# Patient Record
Sex: MALE | Race: WHITE | NOT HISPANIC OR LATINO
[De-identification: names, ages, dates, MRNs, and addresses within clinical notes are randomized per-mention and may not be internally consistent; named-entity substitution may affect disease eponyms.]

---

## 2017-12-11 ENCOUNTER — RX RENEWAL (OUTPATIENT)
Age: 72
End: 2017-12-11

## 2017-12-14 ENCOUNTER — TRANSCRIPTION ENCOUNTER (OUTPATIENT)
Age: 72
End: 2017-12-14

## 2017-12-20 ENCOUNTER — APPOINTMENT (OUTPATIENT)
Dept: UROLOGY | Facility: CLINIC | Age: 72
End: 2017-12-20
Payer: MEDICARE

## 2017-12-20 VITALS
HEIGHT: 68 IN | SYSTOLIC BLOOD PRESSURE: 140 MMHG | DIASTOLIC BLOOD PRESSURE: 80 MMHG | WEIGHT: 185 LBS | BODY MASS INDEX: 28.04 KG/M2 | HEART RATE: 78 BPM | OXYGEN SATURATION: 97 %

## 2017-12-20 DIAGNOSIS — N52.9 MALE ERECTILE DYSFUNCTION, UNSPECIFIED: ICD-10-CM

## 2017-12-20 DIAGNOSIS — E11.9 TYPE 2 DIABETES MELLITUS W/OUT COMPLICATIONS: ICD-10-CM

## 2017-12-20 PROCEDURE — 99203 OFFICE O/P NEW LOW 30 MIN: CPT

## 2017-12-20 RX ORDER — TAMSULOSIN HYDROCHLORIDE 0.4 MG/1
0.4 CAPSULE ORAL
Qty: 90 | Refills: 3 | Status: ACTIVE | COMMUNITY
Start: 1900-01-01 | End: 1900-01-01

## 2017-12-20 RX ORDER — SIMVASTATIN 80 MG/1
TABLET, FILM COATED ORAL
Refills: 0 | Status: ACTIVE | COMMUNITY

## 2017-12-20 RX ORDER — LISINOPRIL 20 MG/1
20 TABLET ORAL
Refills: 0 | Status: ACTIVE | COMMUNITY

## 2017-12-20 RX ORDER — SITAGLIPTIN AND METFORMIN HYDROCHLORIDE 50; 1000 MG/1; MG/1
TABLET, FILM COATED ORAL
Refills: 0 | Status: ACTIVE | COMMUNITY

## 2017-12-20 RX ORDER — ASPIRIN 81 MG
81 TABLET, DELAYED RELEASE (ENTERIC COATED) ORAL
Refills: 0 | Status: ACTIVE | COMMUNITY

## 2017-12-20 RX ORDER — OMEGA-3-ACID ETHYL ESTERS 1 G/1
CAPSULE, LIQUID FILLED ORAL
Refills: 0 | Status: ACTIVE | COMMUNITY

## 2017-12-20 RX ORDER — METFORMIN HYDROCHLORIDE 625 MG/1
TABLET ORAL
Refills: 0 | Status: ACTIVE | COMMUNITY

## 2019-06-03 PROBLEM — N52.9 MALE ERECTILE DISORDER: Status: RESOLVED | Noted: 2017-12-20 | Resolved: 2019-06-03

## 2020-02-14 ENCOUNTER — APPOINTMENT (OUTPATIENT)
Dept: UROLOGY | Facility: CLINIC | Age: 75
End: 2020-02-14
Payer: MEDICARE

## 2020-02-14 VITALS
HEIGHT: 68 IN | HEART RATE: 92 BPM | BODY MASS INDEX: 26.67 KG/M2 | OXYGEN SATURATION: 96 % | DIASTOLIC BLOOD PRESSURE: 72 MMHG | WEIGHT: 176 LBS | SYSTOLIC BLOOD PRESSURE: 144 MMHG | TEMPERATURE: 97.6 F

## 2020-02-14 PROCEDURE — 99213 OFFICE O/P EST LOW 20 MIN: CPT

## 2020-02-14 NOTE — HISTORY OF PRESENT ILLNESS
[FreeTextEntry1] : He is a 74-year-old man who is seen today in follow-up. He was seen once in December 2017. He is on Flomax. Nocturia is only one time. There is no hematuria or dysuria. Testicular pain has resolved. Residual urine today was 60 cc. In September 2019, urinalysis was normal, PSA level 2.98 and hemoglobin A1c was less than 7.\par Previous notes: In 2016, he underwent left-sided orchiectomy after ultrasound showed a suspicious small testicular mass. Final pathology showed scar and no malignancy. According to the patient, he had a postop hematoma which slowly resolved. In July 2017 PSA level was 2.7 and urinalysis was negative.

## 2020-02-14 NOTE — PHYSICAL EXAM
[General Appearance - Well Developed] : well developed [General Appearance - Well Nourished] : well nourished [Normal Appearance] : normal appearance [Well Groomed] : well groomed [General Appearance - In No Acute Distress] : no acute distress [Abdomen Soft] : soft [Abdomen Tenderness] : non-tender [Costovertebral Angle Tenderness] : no ~M costovertebral angle tenderness [Urethral Meatus] : meatus normal [Penis Abnormality] : normal circumcised penis [Urinary Bladder Findings] : the bladder was normal on palpation [Scrotum] : the scrotum was normal [Testes Mass (___cm)] : there were no testicular masses [Prostate Tenderness] : the prostate was not tender [No Prostate Nodules] : no prostate nodules [Prostate Enlarged] : was enlarged [FreeTextEntry1] : The left testis absent. [] : no respiratory distress [Respiration, Rhythm And Depth] : normal respiratory rhythm and effort [Exaggerated Use Of Accessory Muscles For Inspiration] : no accessory muscle use [Oriented To Time, Place, And Person] : oriented to person, place, and time [Affect] : the affect was normal [Mood] : the mood was normal [Not Anxious] : not anxious

## 2020-02-14 NOTE — LETTER BODY
[Dear  ___] : Dear  [unfilled], [Consult Letter:] : I had the pleasure of evaluating your patient, [unfilled]. [Consult Closing:] : Thank you very much for allowing me to participate in the care of this patient.  If you have any questions, please do not hesitate to contact me. [FreeTextEntry1] : [facility]\par [address] \[place], [place], [address]  \par [phone] \par

## 2020-02-14 NOTE — ASSESSMENT
[FreeTextEntry1] : Residual urine volume is minimal. He is voiding well with Flomax. Nocturia is only one time. He could followup once a year. PSA level was normal.\par \par Jaron Wells MD, FACS\par [facility]\par  of Urology\par \par [address], [address]\[place] [address]\par \par [address]\[place] [address]\par \par Tel: [phone]\par Fax: [phone]

## 2020-07-01 ENCOUNTER — LABORATORY RESULT (OUTPATIENT)
Age: 75
End: 2020-07-01

## 2020-07-01 ENCOUNTER — APPOINTMENT (OUTPATIENT)
Dept: PULMONOLOGY | Facility: CLINIC | Age: 75
End: 2020-07-01
Payer: MEDICARE

## 2020-07-01 VITALS
WEIGHT: 162 LBS | HEART RATE: 83 BPM | BODY MASS INDEX: 24.55 KG/M2 | OXYGEN SATURATION: 97 % | SYSTOLIC BLOOD PRESSURE: 133 MMHG | RESPIRATION RATE: 16 BRPM | DIASTOLIC BLOOD PRESSURE: 74 MMHG | HEIGHT: 68 IN

## 2020-07-01 PROCEDURE — 99204 OFFICE O/P NEW MOD 45 MIN: CPT

## 2020-07-01 NOTE — ASSESSMENT
[FreeTextEntry1] : \par \par VISIT DATES \par \par 7/1/2020 Initial visit Referred by Dr Lange for post COVID followup \par \par \par VISIT DATE COMPLAINTS/EVENTS/ROS/PE  \par \par  7/1/2020 No specific complaints No fever No anorexia No SOB on exertion No trouble walking No cough No phlegm No nocturnal awakening with sob  Cardio Dr Granger 483 2020 \par \par \par ALLERGY. 7/1/2020 pncl\par HABITS.   7/1/2020 Quit smoking 20 y 1/4 ppd \par FAMILY.   7/1/2020 [facility] \par TRAVEL. 7/1/2020 No                 \par IMMUNIZATION. 7/1/2020 Flu 2019 Pneumonia 2019 Shingles 2019                           \par PETS.   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   7/1/2020 133/70      \par HR.    7/1/2020 83                     \par PULSE OX. 7/1/2020 ra 97%                            \par WT.       7/1/2020 162 lb                        \par BMI.  7/1/2020 24.7 \par P EDEMA.   7/1/2020 Tr   \par DYSPNEA AT REST. 7/1/20207/1/2020 No \par HOME OXYGEN.  7/1/2020 No           \par HOME NIV.    7/1/2020         \par \par \par MEDICATIONS\par PULMONARY. \par 7/1/2020 \par Eliquis\par \par \par \par NONPLMONARY. \par 7/1/2020\par ASA 81 \par cilostazol 50.2 \par lisinopril 20 \par Simvastat 40 \par Metoprolol 50 \par Metformin\par Januvia \par Tamsulosin \par Naprosyn 500.2 \par \par \par PULMONARY PROBLEMS    \par \par COVID  4/2020 [facility] 3-4 d then rehab\par PULMONARY EMBOLISM 4/2020\par HO PNEUMONIA 4/2020 \par \par \par \par \par EXTRAPULMONARY PROBLEMS  \par \par CAD Dr Polo Granger \par A fib 6/23/2020 ekg a fib \par HO endovenous ablation of bl great saphenous vein \par BPH \par Saw Dr Wells 2/14/2020\par DM \par \par \par \par RESULTS\par \par \par ECHO\par 6/23/2020 ECHO N lvsf \par \par \par ____________________\par SHAZIAMICKEY LELIA HAN 1945 \par VISIT DATE 7/1/2020\par PROBLEM A/R\par ___________________\par HO COVID \par COVID  4/2020 [facility] 3-4 d then rehab\par Seems to have recovered  \par Denies significant symptoms \par \par HO PULMONARY EMBOLISM 4/2020\par On eliquis\par 7/1/2020 Pt to bring all meds with hime next visit\par 7/1/2020 Message left with Dr Cuellar Cardio office to send me echo report and notes  \par \par HO PNEUMONIA 4/2020 \par 7/1/2020 Check cxr labs V duplx legs \par 7/1/2020 RTC 3 w \par \par \par \par \par \par \par \par \par \par \par

## 2020-07-01 NOTE — CONSULT LETTER
[Dear  ___] : Dear  [unfilled], [Consult Letter:] : I had the pleasure of evaluating your patient, [unfilled]. [Please see my note below.] : Please see my note below. [Consult Closing:] : Thank you very much for allowing me to participate in the care of this patient.  If you have any questions, please do not hesitate to contact me. [Sincerely,] : Sincerely, [FreeTextEntry3] : Yours truly,\par \par Papi Trujillo MD\par

## 2020-07-01 NOTE — PHYSICAL EXAM
[No Acute Distress] : no acute distress [Normal Appearance] : normal appearance [No Neck Mass] : no neck mass [Normal Oropharynx] : normal oropharynx [Normal Rate/Rhythm] : normal rate/rhythm [Normal S1, S2] : normal s1, s2 [No Murmurs] : no murmurs [No Resp Distress] : no resp distress [Clear to Auscultation Bilaterally] : clear to auscultation bilaterally [No Abnormalities] : no abnormalities [Benign] : benign [No Clubbing] : no clubbing [No Cyanosis] : no cyanosis [Normal Gait] : normal gait [Normal Color/ Pigmentation] : normal color/ pigmentation [No Edema] : no edema [FROM] : FROM [No Focal Deficits] : no focal deficits [Oriented x3] : oriented x3 [Normal Affect] : normal affect

## 2020-07-03 LAB
ALBUMIN SERPL ELPH-MCNC: 4.4 G/DL
ALP BLD-CCNC: 67 U/L
ALT SERPL-CCNC: 22 U/L
ANION GAP SERPL CALC-SCNC: 13 MMOL/L
AST SERPL-CCNC: 24 U/L
BASOPHILS # BLD AUTO: 0.07 K/UL
BASOPHILS NFR BLD AUTO: 0.8 %
BILIRUB SERPL-MCNC: 0.4 MG/DL
BUN SERPL-MCNC: 24 MG/DL
CALCIUM SERPL-MCNC: 9.5 MG/DL
CHLORIDE SERPL-SCNC: 108 MMOL/L
CO2 SERPL-SCNC: 21 MMOL/L
CREAT SERPL-MCNC: 1.58 MG/DL
DEPRECATED D DIMER PPP IA-ACNC: 154 NG/ML DDU
EOSINOPHIL # BLD AUTO: 0.28 K/UL
EOSINOPHIL # BLD MANUAL: 290 /UL
EOSINOPHIL NFR BLD AUTO: 3.3 %
GLUCOSE SERPL-MCNC: 114 MG/DL
HCT VFR BLD CALC: 41.1 %
HGB BLD-MCNC: 13.3 G/DL
IMM GRANULOCYTES NFR BLD AUTO: 0.6 %
LYMPHOCYTES # BLD AUTO: 2.29 K/UL
LYMPHOCYTES NFR BLD AUTO: 27.2 %
MAN DIFF?: NORMAL
MCHC RBC-ENTMCNC: 32.4 GM/DL
MCHC RBC-ENTMCNC: 34 PG
MCV RBC AUTO: 105.1 FL
MONOCYTES # BLD AUTO: 0.79 K/UL
MONOCYTES NFR BLD AUTO: 9.4 %
NEUTROPHILS # BLD AUTO: 4.94 K/UL
NEUTROPHILS NFR BLD AUTO: 58.7 %
NT-PROBNP SERPL-MCNC: 345 PG/ML
PLATELET # BLD AUTO: 269 K/UL
POTASSIUM SERPL-SCNC: 5.4 MMOL/L
PROCALCITONIN SERPL-MCNC: 0.03 NG/ML
PROT SERPL-MCNC: 6.7 G/DL
RBC # BLD: 3.91 M/UL
RBC # FLD: 14.3 %
SARS-COV-2 IGG SERPL IA-ACNC: 17.7 INDEX
SARS-COV-2 IGG SERPL QL IA: POSITIVE
SODIUM SERPL-SCNC: 142 MMOL/L
TSH SERPL-ACNC: 1.39 UIU/ML
WBC # FLD AUTO: 8.42 K/UL

## 2020-07-22 ENCOUNTER — APPOINTMENT (OUTPATIENT)
Dept: PULMONOLOGY | Facility: CLINIC | Age: 75
End: 2020-07-22
Payer: MEDICARE

## 2020-07-22 VITALS
HEIGHT: 68 IN | WEIGHT: 164 LBS | SYSTOLIC BLOOD PRESSURE: 132 MMHG | HEART RATE: 77 BPM | DIASTOLIC BLOOD PRESSURE: 74 MMHG | OXYGEN SATURATION: 97 % | BODY MASS INDEX: 24.86 KG/M2

## 2020-07-22 DIAGNOSIS — E87.5 HYPERKALEMIA: ICD-10-CM

## 2020-07-22 PROCEDURE — 99214 OFFICE O/P EST MOD 30 MIN: CPT | Mod: 25

## 2020-07-22 PROCEDURE — 36415 COLL VENOUS BLD VENIPUNCTURE: CPT

## 2020-07-22 NOTE — REASON FOR VISIT
[Follow-Up] : a follow-up visit [TextBox_44] : covid 4/2020 pulm emboliism 4/2020 subsm nodule copd

## 2020-07-22 NOTE — CONSULT LETTER
[Dear  ___] : Dear  [unfilled], [Consult Letter:] : I had the pleasure of evaluating your patient, [unfilled]. [Please see my note below.] : Please see my note below. [Consult Closing:] : Thank you very much for allowing me to participate in the care of this patient.  If you have any questions, please do not hesitate to contact me. [Sincerely,] : Sincerely, [FreeTextEntry3] : Yours truly,\par \par Papi Trujillo MD

## 2020-07-22 NOTE — ASSESSMENT
[FreeTextEntry1] : \par \par VISIT DATES \par 7/22/2020 subsequent followup \par \par VISIT DATE COMPLAINTS/EVENTS/ROS/PE  \par \par 7/22/2020.  \par 7/22/2020 No complainst per se No fever No sob No wt loss \par  7/22/2020 Pt never went to er as advised  Says he stopped using orange juice \par 7/22/2020 CXR and V duplex were done at [facility] as per pt \par Labs reviewed \par 7/13/2020 V duplex legs R  Neg-jose raul \par 7/1/2020 W 8.4 Hb 13.3 Plt 269 \par D-dimr 7/1/2020 d-dimer 154 \par AEC 7/1/2020  \par METABOLIC \par 7/1/2020 Na 142 K 5.4 CO2 21 Cr 1.5 \par BNP  \par BNP 7/1 345 (0-300) \par ECHO\par 6/23/2020 ECHO N lvsf \par On eliquis 5.2 (started 4/2020  ASA 81 cilostazol lisinopril 20 metoprolol 50 \par \par \par ALLERGY. 7/1/2020 pncl\par HABITS.   7/1/2020 Quit smoking 20 y 1/4 ppd \par [place]      \par FAMILY.   7/1/2020 Lives in long-term \par TRAVEL. 7/1/2020 No                 \par IMMUNIZATION. \par Flu 2019 \par Pneumonia 2019 \par Shingles 2019                           \par PETS.   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  7/1/2020 No           \par HOME NIV.    7/1/2020         \par \par \par MEDICATIONS\par PULMONARY. \par 7/1/2020 \par Eliquis Apixaban 5\par \par \par \par NONPLMONARY. \par \par 7/1/2020\par ASA 81 \par cilostazol 50.2 \par lisinopril 20 \par Simvastat 40 \par Metoprolol 50 \par Metformin 1000\par Sitagliptin Januvia  50  \par Tamsulosin .4 \par Naprosyn 500.2 \par                     \par                             \par BP.  \par 7/22/2020 130/70  \par 7/1/2020 133/70      \par HR.    \par 7/22/2020 77\par 7/1/2020 83                     \par PULSE OX. \par 7/22/2020 ra 97%\par 7/1/2020 ra 97%                            \par WT.       7/1-7/22/2020 162 lb - 164 lb                        \par BMI.  7/1/2020 24.7 \par \par \par \par \par \par DYSPNEA. \par DATE             MMRC (M)   COMMENTS \par 7/22/2020         1                Is doing a lot of walking \par \par SMOKING\par 7/22/2020 Is not smoking pipe any more \par COUGH \par 7/22/2020 No \par PHLEGM \par 7/22/2020 No\par PEDAL EDEMA \par 7/22/2020 Trace \par \par \par \par \par \par \par \par \par \par ____________________\par PRESSMAN LELIA HAN 1945 \par VISIT DATE 7/22/2020 \par PROBLEM A/R\par ___________________\par \par HYPERKALEMIA \par --------------------\par 7/22/2020 Again advised to call his cardio and dw him re lisinopril as that can cause hyperkalemia \par 7/22/2020\par \par HFPEF\par --------\par BNP  \par BNP 7/1 345 (0-300) \par ECHO\par 6/23/2020 ECHO N lvsf \par Is on acei \par Follows with Cardio \par Has appt Sept 2020 Dr Cuellar \par Seems ccompensated \par \par \par \par COVID \par --------\par CXR \par 7/13/2020 CXR  [id number] NAPD Hyperinflation suggestive of copd Disc atelect v fibrotic scarring r base which is new cw 2018 nsc subcm nodule l base  \par Seems to have recovered clinically and on labs and imaging \par 7/22/2020 Will monitor periodically\par \par VTE \par -----\par 7/13/2020 V duplex legs  [id number] Neg-jose raul \par Is on eliquis for a fin\par \par PLAN \par -------\par 7/22/2020 RTC 2 m and will plan eval of copd and lung nodule seen on CXR 7/11/2020\par 7/22/2020 Check basic \par 7/22/2020 Advised to speak to cardio re hi K as he is on acei \par \par \par ____________________\par VISIT DATE 7/22/2020\par ____________________\par KELLY HAN 1945 7/1/2020      ALLERGY    6/27/2020 pncl    Dr Latesha Lange  74 m \par \par

## 2020-07-29 LAB
ANION GAP SERPL CALC-SCNC: 16 MMOL/L
BUN SERPL-MCNC: 24 MG/DL
CALCIUM SERPL-MCNC: 9.1 MG/DL
CHLORIDE SERPL-SCNC: 106 MMOL/L
CO2 SERPL-SCNC: 22 MMOL/L
CREAT SERPL-MCNC: 1.28 MG/DL
GLUCOSE SERPL-MCNC: 96 MG/DL
POTASSIUM SERPL-SCNC: 4.6 MMOL/L
SODIUM SERPL-SCNC: 144 MMOL/L

## 2020-08-26 ENCOUNTER — APPOINTMENT (OUTPATIENT)
Dept: PULMONOLOGY | Facility: CLINIC | Age: 75
End: 2020-08-26
Payer: MEDICARE

## 2020-08-26 VITALS
HEIGHT: 68 IN | TEMPERATURE: 98.4 F | SYSTOLIC BLOOD PRESSURE: 126 MMHG | HEART RATE: 86 BPM | BODY MASS INDEX: 24.86 KG/M2 | OXYGEN SATURATION: 98 % | WEIGHT: 164 LBS | DIASTOLIC BLOOD PRESSURE: 74 MMHG

## 2020-08-26 PROCEDURE — 99214 OFFICE O/P EST MOD 30 MIN: CPT

## 2020-08-26 NOTE — PHYSICAL EXAM
[Normal Oropharynx] : normal oropharynx [No Acute Distress] : no acute distress [Normal Appearance] : normal appearance [No Neck Mass] : no neck mass [Normal S1, S2] : normal s1, s2 [Normal Rate/Rhythm] : normal rate/rhythm [No Resp Distress] : no resp distress [No Murmurs] : no murmurs [Clear to Auscultation Bilaterally] : clear to auscultation bilaterally [No Abnormalities] : no abnormalities [Normal Gait] : normal gait [Benign] : benign [No Clubbing] : no clubbing [No Edema] : no edema [No Cyanosis] : no cyanosis [FROM] : FROM [Normal Color/ Pigmentation] : normal color/ pigmentation [Oriented x3] : oriented x3 [No Focal Deficits] : no focal deficits [Normal Affect] : normal affect

## 2020-08-26 NOTE — CONSULT LETTER
[Dear  ___] : Dear  [unfilled], [Please see my note below.] : Please see my note below. [Consult Letter:] : I had the pleasure of evaluating your patient, [unfilled]. [Consult Closing:] : Thank you very much for allowing me to participate in the care of this patient.  If you have any questions, please do not hesitate to contact me. [Sincerely,] : Sincerely, [FreeTextEntry3] : Yours truly,\par \par Papi Trujillo MD\par

## 2020-08-26 NOTE — ASSESSMENT
[FreeTextEntry1] : \par \par PULMONARY PROBLEMS    \par \par COVID  \par 4/2020 [facility] 3-4 d then rehab\par COVID IGG 7/1/2020 COVID IGG 17.7 (h) Pos+jose raul \par \par PULMONARY EMBOLISM 4/2020\par \par HO PNEUMONIA 4/2020 \par \par SUBCM LUNG NODULE ON CXR 7/13/2020 \par \par FIBROTIC SCAR R BASE ON CXR DONE 7/13/2020 (NEW FROM 2018)\par \par FORMER SMOKER\par Used to pipe smoke WQuit 4/2020\par \par \par EXTRAPULMONARY PROBLEMS  \par \par HYPERKALEMIA 7/1/2020 \par 7/3 Pt was advised to go to er \par HFPEF\par CAD \par Dr Polo Granger \par A fib \par 6/23/2020 ekg a fib \par HO endovenous ablation of bl great saphenous vein \par BPH \par Saw Dr Wells 2/14/2020\par DM \par \par VISIT DATE COMPLAINTS/EVENTS/ROS/PE  \par \par 8/26/2020 Feels fine Does not drive Is independet \par \par \par MEDICATIONS\par PULMONARY. \par 7/1/2020 \par Eliquis Apixaban 5\par \par \par \par NONPLMONARY. \par \par 7/1/2020\par ASA 81 \par cilostazol 50.2 \par lisinopril 20 \par Simvastat 40 \par Metoprolol 50 \par Metformin 1000\par Sitagliptin Januvia  50  \par Tamsulosin .4 \par Naprosyn 500.2 \par \par                     \par                             \par BP.  \par 8/26/2020 126/70\par 7/22/2020 130/70  \par 7/1/2020 133/70      \par HR.    \par 8/26/2020 86 \par 7/22/2020 77\par 7/1/2020 83                     \par PULSE OX. \par 7/22/2020 ra 97%\par 7/1/2020 ra 97%                            \par WT.       7/1-7/22-8/26/2020 162 lb - 164 lb - 164 lb                       \par BMI.  7/1/2020 24.7 \par \par ____________________\par KELLY HAN 1945 \par VISIT DATE 8/26/2020\par PROBLEM A/R\par ___________________\par \par \par HFPEF\par --------\par BNP  \par BNP 7/1 345 (0-300)\par  \par ECHO\par 6/23/2020 ECHO N lvsf \par Is on acei \par Follows with Cardio \par \par ASSESSMENT/RECOMMENDATION\par Has appt Sept 2020 Dr Cuellar \par Seems ccompensated \par \par \par \par \par \par ____________________\par KELLY HAN 1945 \par VISIT DATE 8/26/2020\par PROBLEM A/R\par ___________________\par \par VENOUS THROMBOEMBOLISM \par ---------------------------------------------\par \par BACKGROUND INFO \par PULMONARY EMBOLISM 4/2020\par \par V DUPLEX\par 7/13/2020 V duplex legs  [phone] Neg-jose raul \par \par EKG\par A fib 6/23/2020 ekg a fib \par \par MEDICATIONS\par PULMONARY. \par 7/1/2020 \par Eliquis Apixaban 5\par \par ASSESSMENT/RECOMMENDATION\par Given the hypercoagulable state associated with COVID would continue apixaban for at least 6 m unless strong contraindication looms up ie at least till Oct 2020 He may need it longer suzy as he has ho a fib \par ____________________\par KELLY HAN 1945 \par VISIT DATE 8/26/2020\par PROBLEM A/R\par ___________________\par \par LUNG NODULE \par ---------------------------------------------\par \par BACKGROUND INFO \par lUNG NODULE REPORTED ON CXR 7/2020 \par \par FORMER SMOKER\par Used to pipe smoke WQuit 4/2020\par \par CXR \par 7/13/2020 CXR  [id number] NAPD Hyperinflation suggestive of copd Disc atelect v fibrotic scarring r base which is new cw 2018 nsc subcm nodule l base \par \par \par ASSESSMENT/RECOMMENDATION\par RTC SEPT 2020 and will plan CT chest and PFTS \par \par \par \par \par \par \par KELLY HAN 1945 7/1/2020      ALLERGY    6/27/2020 pncl    Dr Latesha Lange  74 m \par \par

## 2020-09-16 ENCOUNTER — APPOINTMENT (OUTPATIENT)
Dept: PULMONOLOGY | Facility: CLINIC | Age: 75
End: 2020-09-16
Payer: MEDICARE

## 2020-09-16 VITALS
TEMPERATURE: 98.1 F | BODY MASS INDEX: 24.86 KG/M2 | SYSTOLIC BLOOD PRESSURE: 114 MMHG | RESPIRATION RATE: 16 BRPM | HEIGHT: 68 IN | WEIGHT: 164 LBS | HEART RATE: 82 BPM | DIASTOLIC BLOOD PRESSURE: 68 MMHG | OXYGEN SATURATION: 99 %

## 2020-09-16 PROCEDURE — 99214 OFFICE O/P EST MOD 30 MIN: CPT

## 2020-09-16 NOTE — REASON FOR VISIT
[Follow-Up] : a follow-up visit [Formal Caregiver] : formal caregiver [TextBox_44] : copd lung nodule prior covid

## 2020-09-16 NOTE — CONSULT LETTER
[Dear  ___] : Dear  [unfilled], [Consult Letter:] : I had the pleasure of evaluating your patient, [unfilled]. [Consult Closing:] : Thank you very much for allowing me to participate in the care of this patient.  If you have any questions, please do not hesitate to contact me. [Please see my note below.] : Please see my note below. [Sincerely,] : Sincerely, [FreeTextEntry3] : Yours truly,\par \par Papi Trujillo MD\par

## 2020-09-16 NOTE — ASSESSMENT
[FreeTextEntry1] : \par \par \par \par \par \par \par \par \par \par \par \par VISIT DATE COMPLAINTS/EVENTS/ROS/PE  \par 9/16/2020 Was asked by Dr Cuellar to continue eliquis \par 9/16/2020 No sob No hemoptysis No chest pain \par \par MEDICATIONS\par PULMONARY. \par 7/1/2020 \par Eliquis Apixaban 5\par \par \par \par NONPLMONARY. \par \par 7/1/2020\par ASA 81 \par cilostazol 50.2 \par lisinopril 20 \par Simvastat 40 \par Metoprolol 50 \par Metformin 1000\par Sitagliptin Januvia  50  \par Tamsulosin .4 \par Naprosyn 500.2 \par                     \par                             \par BP.  \par 9/16/2020 114/68\par 8/26/2020 126/70\par 7/22/2020 130/70  \par 7/1/2020 133/70      \par HR.    \par 9/16/2020 82\par 8/26/2020 86 \par 7/22/2020 77\par 7/1/2020 83                     \par PULSE OX. \par 9/16/2020 ra rest 99%\par 7/22/2020 ra 97%\par 7/1/2020 ra 97%                            \par WT.       7/1-7/22-8/26/2020 162 lb - 164 lb - 164 lb                       \par BMI.  7/1/2020 24.7 \par \par \par \par \par DYSPNEA. \par \par DATE             MMRC (M)   COMMENTS \par 9/16/2020        1 \par 7/22/2020         1                Is doing a lot of walking \par \par SMOKING\par 7/22/2020 Is not smoking pipe any more \par COUGH \par 9/16/2020 No \par 7/22/2020 No \par PHLEGM \par 9/16/2020 No \par 7/22/2020 No\par PEDAL EDEMA \par 9/16/2020 Trace \par 7/22/2020 Trace \par \par \par PULMONARY PROBLEMS    \par \par COVID  \par 4/2020 [facility] 3-4 d then rehab\par COVID IGG 7/1/2020 COVID IGG 17.7 (h) Pos+jose raul \par \par PULMONARY EMBOLISM 4/2020\par \par HO PNEUMONIA 4/2020 \par \par SUBCM LUNG NODULE ON CXR 7/13/2020 \par Pipe smoker quit 2015\par \par FIBROTIC SCAR R BASE ON CXR DONE 7/13/2020 (NEW FROM 2018)\par \par FORMER SMOKER\par Used to pipe smoke WQuit 4/2020\par \par \par \par EXTRAPULMONARY PROBLEMS  \par \par HYPERKALEMIA 7/1/2020 \par 7/3 Pt was advised to go to er \par HFPEF\par CAD \par Dr Polo Granger \par A fib \par 6/23/2020 ekg a fib \par HO endovenous ablation of bl great saphenous vein \par BPH \par Saw Dr Wells 2/14/2020\par DM \par \par \par -__________________________\par \par ORDERS/DATE\par \par _______________________________\par \par 9/16/2020\par COPD \par 9/16/2020 Refer PFTS \par 9/16/2020 refer COVID \par \par Lung nodule \par 9/16/2020 refer CT chest no contrast \par 9/16/2020 RTC 3 weeks \par \par HO COVID \par 9/16/2020 No actove intervention needed\par \par HO PULMONARY EMBOLISM\par 9/16/2020 Was asked by his cardio to continue apixaban (presumably for is a fib)\par 9/16/2020 He is going for echo with Dr Granger his Cardio and pt was advised to bring me copy so I can see if he has pulmonary hypertension \par ____________________\par KELLY HAN 1945 7/1/2020      ALLERGY    6/27/2020 pncl    Dr Latesha Lange  74 m\par \par \par \par \par \par \par

## 2020-09-16 NOTE — PHYSICAL EXAM
[No Acute Distress] : no acute distress [No Neck Mass] : no neck mass [Normal Appearance] : normal appearance [Normal Oropharynx] : normal oropharynx [Normal Rate/Rhythm] : normal rate/rhythm [Normal S1, S2] : normal s1, s2 [No Murmurs] : no murmurs [No Resp Distress] : no resp distress [Clear to Auscultation Bilaterally] : clear to auscultation bilaterally [No Abnormalities] : no abnormalities [Normal Gait] : normal gait [Benign] : benign [No Clubbing] : no clubbing [No Cyanosis] : no cyanosis [No Edema] : no edema [FROM] : FROM [Normal Color/ Pigmentation] : normal color/ pigmentation [No Focal Deficits] : no focal deficits [Oriented x3] : oriented x3 [Normal Affect] : normal affect

## 2020-10-09 ENCOUNTER — OUTPATIENT (OUTPATIENT)
Dept: OUTPATIENT SERVICES | Facility: HOSPITAL | Age: 75
LOS: 1 days | End: 2020-10-09
Payer: COMMERCIAL

## 2020-10-09 ENCOUNTER — APPOINTMENT (OUTPATIENT)
Dept: CT IMAGING | Facility: CLINIC | Age: 75
End: 2020-10-09
Payer: MEDICARE

## 2020-10-09 DIAGNOSIS — Z98.89 OTHER SPECIFIED POSTPROCEDURAL STATES: Chronic | ICD-10-CM

## 2020-10-09 DIAGNOSIS — Z90.79 ACQUIRED ABSENCE OF OTHER GENITAL ORGAN(S): Chronic | ICD-10-CM

## 2020-10-09 DIAGNOSIS — K40.90 UNILATERAL INGUINAL HERNIA, WITHOUT OBSTRUCTION OR GANGRENE, NOT SPECIFIED AS RECURRENT: Chronic | ICD-10-CM

## 2020-10-09 DIAGNOSIS — Z98.41 CATARACT EXTRACTION STATUS, RIGHT EYE: Chronic | ICD-10-CM

## 2020-10-09 DIAGNOSIS — J44.9 CHRONIC OBSTRUCTIVE PULMONARY DISEASE, UNSPECIFIED: ICD-10-CM

## 2020-10-09 PROCEDURE — 71250 CT THORAX DX C-: CPT | Mod: 26

## 2020-10-09 PROCEDURE — 71250 CT THORAX DX C-: CPT

## 2020-10-16 ENCOUNTER — APPOINTMENT (OUTPATIENT)
Dept: PULMONOLOGY | Facility: CLINIC | Age: 75
End: 2020-10-16
Payer: MEDICARE

## 2020-10-16 LAB — SARS-COV-2 N GENE NPH QL NAA+PROBE: NOT DETECTED

## 2020-10-16 PROCEDURE — 94010 BREATHING CAPACITY TEST: CPT

## 2020-10-16 PROCEDURE — 94729 DIFFUSING CAPACITY: CPT

## 2020-10-16 PROCEDURE — 94727 GAS DIL/WSHOT DETER LNG VOL: CPT

## 2020-10-21 ENCOUNTER — APPOINTMENT (OUTPATIENT)
Dept: PULMONOLOGY | Facility: CLINIC | Age: 75
End: 2020-10-21
Payer: MEDICARE

## 2020-10-21 VITALS
HEART RATE: 98 BPM | DIASTOLIC BLOOD PRESSURE: 68 MMHG | HEIGHT: 68 IN | OXYGEN SATURATION: 98 % | TEMPERATURE: 98.6 F | WEIGHT: 165 LBS | SYSTOLIC BLOOD PRESSURE: 143 MMHG | RESPIRATION RATE: 16 BRPM | BODY MASS INDEX: 25.01 KG/M2

## 2020-10-21 PROCEDURE — 99072 ADDL SUPL MATRL&STAF TM PHE: CPT

## 2020-10-21 PROCEDURE — 99214 OFFICE O/P EST MOD 30 MIN: CPT | Mod: 25

## 2020-10-21 RX ORDER — BUDESONIDE AND FORMOTEROL FUMARATE DIHYDRATE 80; 4.5 UG/1; UG/1
80-4.5 AEROSOL RESPIRATORY (INHALATION)
Qty: 3 | Refills: 2 | Status: ACTIVE | COMMUNITY
Start: 2020-10-21 | End: 1900-01-01

## 2020-10-21 NOTE — PHYSICAL EXAM
[No Acute Distress] : no acute distress [Normal Oropharynx] : normal oropharynx [Normal Appearance] : normal appearance [No Neck Mass] : no neck mass [Normal Rate/Rhythm] : normal rate/rhythm [Normal S1, S2] : normal s1, s2 [No Murmurs] : no murmurs [No Resp Distress] : no resp distress [Clear to Auscultation Bilaterally] : clear to auscultation bilaterally [No Abnormalities] : no abnormalities [Benign] : benign [Normal Gait] : normal gait [No Clubbing] : no clubbing [No Cyanosis] : no cyanosis [FROM] : FROM [Normal Color/ Pigmentation] : normal color/ pigmentation [No Focal Deficits] : no focal deficits [Oriented x3] : oriented x3 [Normal Affect] : normal affect

## 2020-10-21 NOTE — ADDENDUM
[FreeTextEntry1] : \par \par \par ______________________\par KELLY ROWELL 1945\par PATIENT INSTRUCTIONS \par VISIT DATE 10/21/2020\par ____________________________\par \par 1) See dr Trujillo in 2 mo\par 2) Start symbicort 80/4.5 1 puff as needed if short of breath or wheezing (No more than 8 puffs a day) \par 3) Avoid salt and fluids as you will get swelling feet Keep legs elevated when resting

## 2020-10-21 NOTE — ASSESSMENT
[FreeTextEntry1] : ____________________________\par COPD\par _____________________________\par \par BACKGROUND \par Former smoker Quit 2 y cigarette then pipes\par \par PFTS\par 10/16/2020 \par BEENA FVC 3.78 96% FEV1 2.60 101% FEV1/FVC 69% FEF 25 75 68% \par % FRC N2 116% RV/% DLCO 94% DL/VA 86%\par INTERPRETATION  10/16/2020\par MILD OBSTRUCTION pRESERVED DIFFUSN SUGGESTS CHRONIC RONCHITIS OR ASTHMA \par Patient has hyperinflation air trapping \par \par                     \par                             \par BP.  \par 10/21/2020 140/60 \par 9/16/2020 114/68\par 8/26/2020 126/70\par 7/22/2020 130/70  \par 7/1/2020 133/70      \par HR.    \par 10/21/2020 98 \par 9/16/2020 82\par 8/26/2020 86 \par 7/22/2020 77\par 7/1/2020 83                     \par PULSE OX. \par 10/21/2020 ra 98%\par 9/16/2020 ra rest 99%\par 7/22/2020 ra 97%\par 7/1/2020 ra 97%                            \par WT.       7/1-7/22-8/26-10/21/2020 162 lb - 164 lb - 164 lb - 165 lb                       \par BMI.  7/1-10/21/2020 24.7 - 25\par \par SMOKING\par 7/22/2020 Is not smoking pipe any more \par COUGH \par 9/16/2020 No \par 7/22/2020 No \par PHLEGM \par 9/16/2020 No \par 7/22/2020 No\par PEDAL EDEMA \par 9/16/2020 Trace \par 7/22/2020 Trace\par  \par \par ASSESSMENT RECOMMENDATIONS\par 10/21/2020 \par PFTS\par 10/16/2020 \par BEENA FVC 3.78 96% FEV1 2.60 101% FEV1/FVC 69% FEF 25 75 68% \par % FRC N2 116% RV/% DLCO 94% DL/VA 86%\par INTERPRETATION  10/16/2020\par MILD OBSTRUCTION pRESERVED DIFFUSN SUGGESTS CHRONIC RONCHITIS OR ASTHMA \par Patient has hyperinflation air trapping\par \par 10/21/2020 As he is not very symptomatic will give symbicort 80 1 puff as needed\par 10/21/2020 Pt given inhaler instructions \par \par \par ____________________________________\par COVID PNEUMONIA 4/2020  \par ______________________________________\par \par BACKGROUND INFORMATION\par 4/2020 [facility] 3-4 d then rehab\par COVID LABS\par COVID IGG 7/1/2020 COVID IGG 17.7 (h) Pos+jose raul \par \par \par \par CXR \par 7/13/2020 CXR  061 6958 \par NAPD \par Hyperinflation suggestive of copd \par Disc atelect v fibrotic scarring r base which is new cw 2018 \par nsc subcm nodule l base  \par AEC\par AEC 7/3/2020  \par MT\par Pr 7/3/2020 Pr .03 \par \par ASSESSMENT/RECOMMENDATIONS \par Seems to have recovered clinically and on labs and imaging \par ____________________________________\par PULMONARY EMBOLISM  4/2020  \par _______________________\par V DUPLEX\par 7/13/2020 V duplex legs  002 2505 Neg-jose raul \par BNP\par BNP 7/3/2020 bnp 345 \par D-DIMR\par D-dimr 7/3/2020 d-dimr 154 \par \par ASSESSMENT/RECOMMENDATIONS\par Is on eliquis for a fib\par \par 9/16/2020 Was asked by his cardio to continue apixaban (presumably for is a fib)\par _________________________________\par SUBCM LUNG NODULE ON CXR 7/13/2020 \par ___________________________\par \par FORMER SMOKER\par Used to pipe smoke \par Quit 4/2020\par CXR \par 7/13/2020 CXR  079 2541 \par NAPD \par Hyperinflation suggestive of copd \par Disc atelect v fibrotic scarring r base which is new cw 2018 \par nsc subcm nodule l base\par \par \par CT Chest\par CT chest 10/9/2020 \par 1) RLL thick linear opacity likely atelectasis \par 2) lll subpleural 3 mm nodulelikely represents intrapulmonary lymph node\par \par ASSESSMENT RECOMMENDATION\par 10/21/2020 Recheck in 6 m \par \par _________________________________\par FIBROTIC SCAR R BASE ON CXR DONE 7/13/2020 (NEW FROM 2018)\par ___________________________________\par \par CXR \par 7/13/2020 CXR  450 9859 \par NAPD \par Hyperinflation suggestive of copd \par Disc atelect v fibrotic scarring r base which is new cw 2018 \par nsc subcm nodule l base\par FORMER SMOKER\par Used to pipe smoke \par Quit 4/2020\par \par ASSESSMENT RECOMMENDATION\par 10/21/2020 Will get ct chest in 6 m to follow \par ____________________________________\par HFPEF\par ______________________________________\par \par BNP  \par BNP (0-300)  7/1 bnp 345\par ECHO\par 6/23/2020 ECHO N lvsf \par PEDAL EDEMA \par 10/21/2020 1 plus \par ASSESSMENT RECOMMENDATIONS\par Is on acei \par Follows with Cardio \par Has appt Sept 2020 Dr Cuellar \par Seems ccompensated \par 10/21/2020 Advised salt and fluid restriction \par \par \par ______________________\par KELLY ROWELL 1945\par PATIENT INSTRUCTIONS \par VISIT DATE 10/21/2020\par ____________________________\par \par 1) See dr Trujillo in 2 mo\par 2) Start symbicort 80/4.5 1 puff as needed if short of breath or wheezing (No more than 8 puffs a day) \par 3) Avoid salt and fluids as you will get swelling feet Keep legs elevated when resting  \par \par

## 2020-12-16 ENCOUNTER — APPOINTMENT (OUTPATIENT)
Dept: PULMONOLOGY | Facility: CLINIC | Age: 75
End: 2020-12-16
Payer: MEDICARE

## 2020-12-16 VITALS
HEIGHT: 68 IN | RESPIRATION RATE: 16 BRPM | DIASTOLIC BLOOD PRESSURE: 74 MMHG | OXYGEN SATURATION: 99 % | TEMPERATURE: 98.5 F | BODY MASS INDEX: 26.37 KG/M2 | HEART RATE: 79 BPM | SYSTOLIC BLOOD PRESSURE: 126 MMHG | WEIGHT: 174 LBS

## 2020-12-16 PROCEDURE — 99214 OFFICE O/P EST MOD 30 MIN: CPT

## 2020-12-16 PROCEDURE — 99072 ADDL SUPL MATRL&STAF TM PHE: CPT

## 2020-12-16 NOTE — REASON FOR VISIT
[Follow-Up] : a follow-up visit [COPD] : COPD [Pulmonary Embolism] : pulmonary embolism [Pulmonary Nodules] : pulmonary nodules [TextBox_13] : DR STU CRESPO

## 2020-12-16 NOTE — ASSESSMENT
[FreeTextEntry1] : \par _________________________________\par DATE EVENTS C/C PERTINENT ROS/PE\par ______________________________\par \par \par 2020 Feels his feet are puffy Is seeing Cardio Is trying not to eat salt \par 2020 Feels better with symbicort Washes mouth with water after using it\par 2020 is not sob\par 2020 Is taking eliquis \par _____________________________\par PROBLEM LIST\par _____________________________ .  \par \par COPD \par Pipe smoker quit 2020\par 10/16/2020  VC 3.78 96% FEV1 2.60 101% FEV1/FVC 69% FEF 25 75 68% \par % FRC N2 116% RV/% DLCO 94% DL/VA 86%\par INTERPRETATION  10/16/2020\par MILD OBSTRUCTION pRESERVED DIFFUSN SUGGESTS CHRONIC RONCHITIS OR ASTHMA \par Patient has hyperinflation air trapping \par 10/21/2020 Started on symbicort 80 1 puff as needed\par 2020 Feels bettwe with symbicort\par 2020 Gave script for spacer and went over inhaler instrns\par \par EVALUATION NEED FOR HOME OXYGEN\par 2020 RA rest PO 99% \par \par LUNG NODULE \par former pipe smoker quit 2020\par AEC 7/3/2020  \par CT chest 10/9/2020 \par 1) RLL thick linear opacity likely atelectasis \par 2) lll subpleural 3 mm nodulelikely represents intrapulmonary lymph node\par \par FIBROTIC SCAR R BASE ON CXR DONE 2020 (NEW FROM 2018)\par 2020 CXR  737 0766  \par Hyperinflation suggestive of copd \par Disc atelect v fibrotic scarring r base which is new cw 2018 \par nsc subcm nodule l base\par \par FORMER SMOKER\par Used to pipe smoke \par Quit 2020\par \par COVID PNEUMONIA 2020  \par 2020 [facility] 3-4 d then rehab\par \par PULMONARY EMBOLISM  2020  \par 2020 V duplex legs  926 1439 Neg-jose raul \par BNP 7/3/2020 bnp 345 \par D-dimr 7/3/2020 d-dimr 154 \par Is on eliquis for a fib\par 2020 Was asked by his cardio to continue apixaban (presumably for is a fib)\par ____________________________\par ASSESSMENT/RECOMMENDATIONS. \par _____________________________\par \par FOLLOWUP. \par 2020 RTC 2 m \par \par COPD. \par 2020 Advised to bring inhalers with him next visit \par 2020 Does not need home oxygen\par \par PULMONARY EMBOLISM. \par 2020 Is on eliquis\par 2020 Will bring all meds next visit \par \par LUNG NODULES SCARRING.\par 2020 Will plan ct ch late spring 2021\par \par _____________________\par BEST PRACTICE ISSUES\par ___________________\par \par IMMUNIZATION NEED EVALUATION. \par 2020 States he has taken pneumonia and flu shot \par \par SMOKING STATUS EVALUATION.\par 2020 Is not smoking \par \par EVALUATION OF NEED FOR LUNG CANCER SCREENING.\par former pipe smoker quit 2020\par Is already getting ct ch last done 10/2020 for subcmnnodule and linear opacities \par \par INHALER TECHNIQUE TRAINING.\par 2020 Given training\par \par SPACER USE EVALUATION. \par 2020 Given script for spacer\par \par \par \par ___________________\par PATIENT NAME KEN ROWELL 1945 \par PATIENT INSTRUCTIONS \par Copy was handed to patient at end of visit\par DATE OF VISIT  2020\par _______________________________\par \par 1) Come back to see Dr Trujillo 2 M\par 2) Get spacer and use symbicort 1 puff as needed every 4 hours when short of breath or coughing and no more than 8 puffs in a day  and wash mouth with water after using symbicort\par 3)  I am giving you a script for spacer\par 4) BRING ALL MEDS THAT YOU TAKE WITH YOU IN A BAG SO I CAN CONFIRM

## 2020-12-21 ENCOUNTER — APPOINTMENT (OUTPATIENT)
Dept: UROLOGY | Facility: CLINIC | Age: 75
End: 2020-12-21
Payer: MEDICAID

## 2020-12-21 VITALS
SYSTOLIC BLOOD PRESSURE: 140 MMHG | BODY MASS INDEX: 25.01 KG/M2 | DIASTOLIC BLOOD PRESSURE: 60 MMHG | TEMPERATURE: 98.3 F | HEIGHT: 68 IN | WEIGHT: 165 LBS

## 2020-12-21 DIAGNOSIS — U07.1 COVID-19: ICD-10-CM

## 2020-12-21 DIAGNOSIS — R35.1 BENIGN PROSTATIC HYPERPLASIA WITH LOWER URINARY TRACT SYMPMS: ICD-10-CM

## 2020-12-21 DIAGNOSIS — R97.20 ELEVATED PROSTATE, SPECIFIC ANTIGEN [PSA]: ICD-10-CM

## 2020-12-21 DIAGNOSIS — N40.1 BENIGN PROSTATIC HYPERPLASIA WITH LOWER URINARY TRACT SYMPMS: ICD-10-CM

## 2020-12-21 PROCEDURE — 99213 OFFICE O/P EST LOW 20 MIN: CPT

## 2020-12-21 PROCEDURE — 99072 ADDL SUPL MATRL&STAF TM PHE: CPT

## 2020-12-21 NOTE — ASSESSMENT
[FreeTextEntry1] : Urinary symptoms seem to be stable on Flomax.  We had a long discussion regarding his PSA level. We discussed different PSA parameters such as PSA velocity, free PSA and age-adjusted PSA level. Other markers such as 4K score, PCA3, Prostate health Index, etc were reviewed.  We also discussed observation with repeat PSA level, prostate or pelvic MRI and prostate biopsy in detail. Risks and benefits of each option were discussed and questions were answered. Patient was made aware that prostate cancer can exist at any PSA level. First, PSA level will be repeated in about 3 months and then we will decide on the next step.\par \par Jaron Wells MD, FACS\par [facility]\par  of Urology\par \par [address], [address]\[place] [address]\par \par [address], [address]\[place] [address]\par \par Tel: [phone]\par Fax: [phone]

## 2020-12-21 NOTE — HISTORY OF PRESENT ILLNESS
[FreeTextEntry1] : He is a 75-year-old man who is seen today in follow-up.  According to the patient, he was diagnosed with pulmonary embolism and  placed on anticoagulation therapy.  His urinary symptoms remained stable with Flomax with nocturia 1 or 2 times.  Residual urine was about 50 cc.  In December 2020, PSA level was 4.6, in September 2020 was 3.8 and in September 2019 was 2.98.  There is no known family history of prostate cancer. There is no hematuria or dysuria. \par \par Previous notes: In 2016, he underwent left-sided orchiectomy after ultrasound showed a suspicious small testicular mass. Final pathology showed scar and no malignancy. According to the patient, he had a postop hematoma which slowly resolved. In July 2017 PSA level was 2.7 and urinalysis was negative.

## 2020-12-21 NOTE — LETTER BODY
[Dear  ___] : Dear  [unfilled], [Consult Letter:] : I had the pleasure of evaluating your patient, [unfilled]. [Consult Closing:] : Thank you very much for allowing me to participate in the care of this patient.  If you have any questions, please do not hesitate to contact me. [FreeTextEntry1] : [facility] ([place], [place]) [facility]\par [address] \[place], [place], [address]  \par [phone] \par

## 2020-12-21 NOTE — PHYSICAL EXAM
[General Appearance - Well Developed] : well developed [General Appearance - Well Nourished] : well nourished [Normal Appearance] : normal appearance [Well Groomed] : well groomed [General Appearance - In No Acute Distress] : no acute distress [Abdomen Soft] : soft [Abdomen Tenderness] : non-tender [Costovertebral Angle Tenderness] : no ~M costovertebral angle tenderness [Urethral Meatus] : meatus normal [Penis Abnormality] : normal circumcised penis [Urinary Bladder Findings] : the bladder was normal on palpation [Scrotum] : the scrotum was normal [Testes Mass (___cm)] : there were no testicular masses [Prostate Tenderness] : the prostate was not tender [No Prostate Nodules] : no prostate nodules [Prostate Enlarged] : was enlarged [FreeTextEntry1] : The left testis absent. [Oriented To Time, Place, And Person] : oriented to person, place, and time [Not Anxious] : not anxious [Inguinal Lymph Nodes Enlarged Bilaterally] : inguinal

## 2021-02-17 RX ORDER — APIXABAN 5 MG/1
5 TABLET, FILM COATED ORAL
Qty: 60 | Refills: 0 | Status: ACTIVE | COMMUNITY
Start: 1900-01-01 | End: 1900-01-01

## 2021-02-24 ENCOUNTER — APPOINTMENT (OUTPATIENT)
Dept: PULMONOLOGY | Facility: CLINIC | Age: 76
End: 2021-02-24
Payer: MEDICARE

## 2021-02-24 VITALS
TEMPERATURE: 97.8 F | HEIGHT: 68 IN | HEART RATE: 76 BPM | SYSTOLIC BLOOD PRESSURE: 147 MMHG | OXYGEN SATURATION: 96 % | BODY MASS INDEX: 24.86 KG/M2 | WEIGHT: 164 LBS | DIASTOLIC BLOOD PRESSURE: 75 MMHG

## 2021-02-24 PROCEDURE — 99214 OFFICE O/P EST MOD 30 MIN: CPT

## 2021-02-24 PROCEDURE — 99072 ADDL SUPL MATRL&STAF TM PHE: CPT

## 2021-02-24 NOTE — ASSESSMENT
[FreeTextEntry1] : \par \par \par ____________\par PROBLEM LIST\par ______________\par                     \par \par COPD \par Pipe smoker quit 4/2020\par 10/16/2020  VC 3.78 96% FEV1 2.60 101% FEV1/FVC 69% FEF 25 75 68% \par % FRC N2 116% RV/% DLCO 94% DL/VA 86%\par INTERPRETATION  10/16/2020\par MILD OBSTRUCTION pRESERVED DIFFUSN SUGGESTS CHRONIC RONCHITIS OR ASTHMA \par Patient has hyperinflation air trapping \par 10/21/2020 Started on symbicort 80 1 puff as needed\par 2/24/2021 Feels better while on symbicort\par \par LUNG NODULE \par former pipe smoker quit 4/2020\par AEC 7/3/2020  \par CT chest 10/9/2020 \par 1) RLL thick linear opacity likely atelectasis \par 2) lll subpleural 3 mm nodulelikely represents intrapulmonary lymph node\par 2/24/2021 Refer ct chest Apr 2021 [facility]\par 2/24/2021 RTC 2 m\par \par COVID PNEUMONIA 4/2020  \par 4/2020 [facility] 3-4 d then rehab\par \par PULMONARY EMBOLISM  4/2020  \par 7/13/2020 V duplex legs  116 8293 Neg-jose raul \par BNP 7/3/2020 bnp 345 \par D-dimr 7/3/2020 d-dimr 154 \par Is on eliquis for a fib\par 9/16/2020 Was asked by his cardio to continue apixaban (presumably for is a fib)\par      \par \par \par \par IMMUNIZATION STATUS ASSESSMENT. \par 2/24/2021 tOOK FLU SHOT FALL 2020\par 2/24/2021 TOOM PNEUMONIA SHOT FALL 2020 AS PER PATIENT \par SEEMS UPTODATE \par SMOKING SCREENING. \par QUIT SMOKING 2018 PIPE SMOKER \par LUNG CANCER SCREENIMNG ELIGIBILITY.\par DOES NOT MEET ELIGIBILITY CRITERIA AS HE WAS LIGHT SMOKER\par NEED FOR HOME OXYGEN ASSESSMENT.\par 2/24/2021 RA REST PO 96% DOES NOT NEED HOME O2 \par \par \par \par \par

## 2021-02-24 NOTE — REASON FOR VISIT
[Follow-Up] : a follow-up visit [COPD] : COPD [Pulmonary Nodules] : pulmonary nodules [TextBox_13] : DR STU CRESPO

## 2021-04-16 ENCOUNTER — OUTPATIENT (OUTPATIENT)
Dept: OUTPATIENT SERVICES | Facility: HOSPITAL | Age: 76
LOS: 1 days | End: 2021-04-16
Payer: COMMERCIAL

## 2021-04-16 ENCOUNTER — RESULT REVIEW (OUTPATIENT)
Age: 76
End: 2021-04-16

## 2021-04-16 ENCOUNTER — APPOINTMENT (OUTPATIENT)
Dept: CT IMAGING | Facility: CLINIC | Age: 76
End: 2021-04-16
Payer: MEDICARE

## 2021-04-16 DIAGNOSIS — K40.90 UNILATERAL INGUINAL HERNIA, WITHOUT OBSTRUCTION OR GANGRENE, NOT SPECIFIED AS RECURRENT: Chronic | ICD-10-CM

## 2021-04-16 DIAGNOSIS — Z98.41 CATARACT EXTRACTION STATUS, RIGHT EYE: Chronic | ICD-10-CM

## 2021-04-16 DIAGNOSIS — Z00.8 ENCOUNTER FOR OTHER GENERAL EXAMINATION: ICD-10-CM

## 2021-04-16 DIAGNOSIS — Z90.79 ACQUIRED ABSENCE OF OTHER GENITAL ORGAN(S): Chronic | ICD-10-CM

## 2021-04-16 DIAGNOSIS — Z98.89 OTHER SPECIFIED POSTPROCEDURAL STATES: Chronic | ICD-10-CM

## 2021-04-16 PROCEDURE — 71250 CT THORAX DX C-: CPT

## 2021-04-16 PROCEDURE — 71250 CT THORAX DX C-: CPT | Mod: 26

## 2021-04-28 ENCOUNTER — APPOINTMENT (OUTPATIENT)
Dept: PULMONOLOGY | Facility: CLINIC | Age: 76
End: 2021-04-28
Payer: MEDICARE

## 2021-04-28 VITALS
SYSTOLIC BLOOD PRESSURE: 126 MMHG | BODY MASS INDEX: 26.37 KG/M2 | HEIGHT: 68 IN | WEIGHT: 174 LBS | OXYGEN SATURATION: 95 % | DIASTOLIC BLOOD PRESSURE: 81 MMHG | TEMPERATURE: 97.9 F | HEART RATE: 81 BPM

## 2021-04-28 PROCEDURE — 99072 ADDL SUPL MATRL&STAF TM PHE: CPT

## 2021-04-28 PROCEDURE — 99214 OFFICE O/P EST MOD 30 MIN: CPT

## 2021-04-28 NOTE — ASSESSMENT
[FreeTextEntry1] : \par \par ___________\par PROBLEM DATA\par ______________\par     \par COPD\par 10/16/2020  FVC 3.78 96% FEV1 2.60 101% FEV1/FVC 69% FEF 25 75 68% \par % FRC N2 116% RV/% DLCO 94% DL/VA 86%\par MILD OBSTRUCTION pRESERVED DIFFUSN SUGGESTS CHRONIC RONCHITIS OR ASTHMA \par Patient has hyperinflation air trapping\par LUNG NODULE\par FIBROTIC SCAR R BASE ON CXR DONE 7/13/2020 (NEW FROM 2018)\par ct chest 4/19/2021 cw 10/9/2020 \par rll linear opac unchanged 1 y ct recommended \par 3 mm lll pulm nodule nsc \par FORMER SMOKER\par QUIT SMOKING 2018 PIPE SMOKER \par COVID PNEUMONIA 4/2020  \par PULMONARY EMBOLISM  4/2020  \par Is on apixaban for a fib (indefinitely) \par HYPERKALEMIA 7/1/2020 \par 7/3 Pt was advised to go to er \par HFPEF\par 6/23/2020 ECHO N lvsf \par 7/22/2020 Again advised to call his cardio and dw him re lisinopril as that can cause hyperkalemia \par A fib \par 6/23/2020 ekg a fib \par HO endovenous ablation of bl great saphenous vein\par CAD \par Dr Polo Granger \par BPH \par Saw Dr Wells 2/14/2020\par DM \par \par ___________ \par PROGRESS COPD.  \par ___________\par 4/28/2021 Uses sybicort  2p couple times a day Feels it helps \par \par MEDS  \par eliquis \par symbicort 80 1 p prn (10/21/2020) \par ___________________\par GENERAL ASSESSMENT/RECOMMEND (A/R)\par _____________________\par \par OBESITY A/R. Is not obese \par EDS. \par SLEEP APNEA SCREEN. 4/28/2021 Lives alone Does not know if he snores or stops breathing \par SLEEP APNEA A/R. No obvious sleep apnea sas \par FLU VACCINE.\par PNEUMONIA VACCINE.\par COVID VACCINE. \par Flu 2019 \par Pneumonia 2019 \par Shingles 2019    \par 2/24/2021 tOOK FLU SHOT FALL 2020\par 2/24/2021 TOOM PNEUMONIA SHOT FALL 2020 AS PER PATIENT \par 4/28/2021 Last moderan Apr 2021 \par IMMUNIZATION A/R. \par LUNG CANCER SCREENING ELIGIBILITY A/R. \par DOES NOT MEET ELIGIBILITY CRITERIA AS HE WAS LIGHT SMOKER\par SMOKING STATUS. Not smking \par SMOKING INTERVENTION. \par ASSESSMENT NEED FOR HOME OXYGEN. \par does not need home o2 \par _______________________\par PULMONARY EMBOLISM\par ASSESSMENT/RECOMMENDATIONS\par _____________________\par Had pe in setting of COVID 4/2020\par 7/13/2020 V duplx was negv \par 9/16/2020 Was asked by his cardio to continue apixaban (presumably for is a fib)\par Is on eliquis for a fib\par \par __________\par ASSESSMENT RECOMMENDATIONS \par LUNG NODULE \par _____________\par \par ct chest 4/19/2021 cw 10/9/2020 \par rll linear opac unchanged 1 y ct recommended \par 3 mm lll pulm nodule nsc \par 4/28/2021 Will plan ct in 1 y\par \par ____________\par COPD\par ASSESSMENT/RECOMMENDATIONS \par ____________________\par Is doing well on symbicot\par \par \par \par \par \par ___________________\par PATIENT:     SHAZIAMICKEYLELIA           \par \par \par PATIENT INSTRUCTIONS \par _____________________\par \par 1) Come back to see Dr Trujillo after  3 m OR SOONER AS NEEDED\par 2)  We will plan Ct Chest 4/2022\par \par ___________________\par Copy was handed to patient at end of visit

## 2021-06-18 ENCOUNTER — RX RENEWAL (OUTPATIENT)
Age: 76
End: 2021-06-18

## 2021-07-21 ENCOUNTER — APPOINTMENT (OUTPATIENT)
Dept: PULMONOLOGY | Facility: CLINIC | Age: 76
End: 2021-07-21
Payer: MEDICARE

## 2021-07-21 VITALS
WEIGHT: 177 LBS | OXYGEN SATURATION: 97 % | HEIGHT: 68 IN | SYSTOLIC BLOOD PRESSURE: 124 MMHG | DIASTOLIC BLOOD PRESSURE: 68 MMHG | TEMPERATURE: 97.8 F | BODY MASS INDEX: 26.83 KG/M2 | HEART RATE: 73 BPM | RESPIRATION RATE: 16 BRPM

## 2021-07-21 PROCEDURE — 99214 OFFICE O/P EST MOD 30 MIN: CPT

## 2021-07-21 NOTE — DISCUSSION/SUMMARY
[FreeTextEntry1] : \par ____________\par PATIENT DATA\par ___________________\par \par AGE.   75 (4/28/2021)                   \par SEX.     m \par ALLERGY. 4/28/2021 pnc hives  \par \par \par SMOKING HISTORY. \par QUIT SMOKING 2018 PIPE SMOKER \par PETS.   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 4/28/2021 Mother breast cancer \par FAMILY HO VTE. 4/28/2021 brother is on warfarin \par FAM HO ASTHMA.  4/28/2021 No \par \par ____________\par PATIENT DATA\par ___________________\par DYSPNEA SCORE. \par 4/28/2021 MMRC 1 Walks a lot \par 7/22/2020         MMRC 1                Is doing a lot of walking \par PEAK FLOW. \par PULSE OXIMETRY. \par 7/21/2021 RA rest 97% \par 4/28/2021 ra rest 95%\par 2/24/2021 RA REST PO 96%\par 12/16/2020 RA rest PO 99% \par 10/21/2020 ra 98%\par 9/16/2020 ra rest 99%\par 7/22/2020 ra 97%\par 7/1/2020 ra 97%   \par HOME OXYGEN.  7/1/2020 No           \par HOME NIV.    7/1/2020        \par HOME NIV.  \par HOME NEBUL.    \par BP.\par 7/21/2021 120/68\par WEIGHT. \par 7/21/2021 177 lb \par 4/28/2021 174 lb\par BMI.          \par 7/21/2021 26\par 4/28/2021 26\par WT.       7/1-7/22-8/26-10/21/2020 162 lb - 164 lb - 164 lb - 165 lb                       \par BMI.  7/1-10/21/2020 24.7 - 25\par EDS. \par STOP BANG SCORE.  \par ____________\par PATIENT DATA\par ___________________\par \par FLU VACCINE.   tOOK FLU SHOT FALL 2020\par PNEUMONIA VACCINE.\par Pneumonia 2019 \par COVID VACCINE. Last moderan Apr 2021 \par COVID STATUS. \par  \par CONTACT.   \par INITIAL VISIT.\par PCP. STU CRESPO MD \par \par ___________ \par MEDS. \par ___________\par \par 7/21/2021\par SYMBICORT 160 prn 2p bid Has spacer Washes mouth after use \par ASA 81 \par ELIQ 5.2 \par LISINOPRIL 20\par METFORMIN 1000\par METOPROLOL 50\par SIMVASTAT 40\par SITAGLIPTIN 50\par TAMSULOSIN .4 \par

## 2021-07-21 NOTE — REASON FOR VISIT
[Follow-Up] : a follow-up visit [Asthma] : asthma [COPD] : COPD [Shortness of Breath] : shortness of breath [Pulmonary Nodules] : pulmonary nodules [TextBox_13] : Dr. STU CRESPO

## 2021-07-21 NOTE — CONSULT LETTER
[Dear  ___] : Dear  [unfilled], [Consult Letter:] : I had the pleasure of evaluating your patient, [unfilled]. [Please see my note below.] : Please see my note below. [Consult Closing:] : Thank you very much for allowing me to participate in the care of this patient.  If you have any questions, please do not hesitate to contact me. [FreeTextEntry3] : Yours truly,\par \par Papi Trujillo MD

## 2021-07-21 NOTE — ASSESSMENT
[FreeTextEntry1] : \par ___________________\par GENERAL ASSESSMENT/RECOMMEND (A/R)\par _____________________\par \par NEED FOR HOME OXYGEN.   \par OBESITY.not obese\par IMMUNIZATION. UpTo date with flu covid and pneum\par LUNG CANCER SCREENING ELIGIBILITY. DOES NOT MEET ELIGIBILITY CRITERIA AS HE WAS LIGHT SMOKER\par SMOKING STATUS. NONSMOKER\par SMOKING INTERVENTION. \par _______________________\par PULMONARY EMBOLISM\par ASSESSMENT/RECOMMENDATIONS\par _____________________\par Had pe in setting of COVID 4/2020\par 7/13/2020 V duplx was negv \par 9/16/2020 Was asked by his cardio to continue apixaban (presumably for is a fib)\par Is on eliquis for a fib\par __________\par ASSESSMENT RECOMMENDATIONS \par LUNG NODULE \par _____________\par \par ct chest 4/19/2021 cw 10/9/2020 \par rll linear opac unchanged 1 y ct recommended \par 3 mm lll pulm nodule nsc \par Plan CT around 4/2022\par ______________\par COPD\par ______________\par         \par 10/16/2020  FVC 3.78 96% FEV1 2.60 101% FEV1/FVC 69% FEF 25 75 68% \par % FRC N2 116% RV/% DLCO 94% DL/VA 86%\par MILD OBSTRUCTION PRESERVED DIFFUSN SUGGESTS CHRONIC RONCHITIS OR ASTHMA \par Patient has hyperinflation air trapping\par \par Former pipe smoker No childhood asthma \par 7/21/2021 No complaints \par 7/21/2021 No ER or hosp visits for shortness of breath\par 7/21/2021 Uses symbicort 2p bid with spacer and washes mouth with water after use \par 7/21/2021 Has mild COPD \par Cont present Rx \par 7/21/2021 RTC 3 m \par \par _______________\par HFPEF\par ______________\par \par Is on acei \par 6/23/2020 ECHO N lvsf \par Follows with Cardio \par Has appt Sept 2020 Dr Cuellar \par Seems ccompensated \par 7/21/2021 Cont rx\par _______________\par ON ELIQUIS  FOR A fib\par ______________\par \par 7/21/2021 Is on eliquisb \par \par

## 2021-10-20 ENCOUNTER — APPOINTMENT (OUTPATIENT)
Dept: PULMONOLOGY | Facility: CLINIC | Age: 76
End: 2021-10-20
Payer: MEDICARE

## 2021-10-20 VITALS
HEART RATE: 70 BPM | BODY MASS INDEX: 26.83 KG/M2 | DIASTOLIC BLOOD PRESSURE: 74 MMHG | OXYGEN SATURATION: 95 % | SYSTOLIC BLOOD PRESSURE: 130 MMHG | TEMPERATURE: 98 F | WEIGHT: 177 LBS | HEIGHT: 68 IN

## 2021-10-20 PROCEDURE — 99214 OFFICE O/P EST MOD 30 MIN: CPT

## 2021-10-20 NOTE — ASSESSMENT
[FreeTextEntry1] : ___________________\par GENERAL ASSESSMENT/RECOMMEND (A/R)\par _____________________\par \par NEED FOR HOME OXYGEN.   10/20/2021 ra rest 95% So does need home oxygen\par OBESITY.not obese\par IMMUNIZATION. UpTo date with flu covid and pneum\par LUNG CANCER SCREENING ELIGIBILITY. DOES NOT MEET ELIGIBILITY CRITERIA AS HE WAS LIGHT SMOKER\par SMOKING STATUS. NONSMOKER\par SMOKING INTERVENTION. \par EDS. \par _______________________\par PULMONARY EMBOLISM  4/2020  \par ______________________-\par \par V DUPLX 7/13/2020 V duplex legs  777 4404 Neg-jose raul \par BNP 7/3/2020 bnp 345 \par D-dimr 7/3/2020 d-dimr 154 \par MEDS Is on eliquis for a fib\par 9/16/2020 Was asked by his cardio to continue apixaban (presumably for is a fib)\par ASSESSMENT RECOMMENDATION\par VTE recurrence is unlikely being on apixaban\par ______________\par FIBROTIC SCAR R BASE ON CXR DONE 7/13/2020 (NEW FROM 2018)\par _______________\par \par FORMER SMOKER\par Used to pipe smoke \par Quit 4/2020\par CT\par ct chest 4/19/2021 cw 10/9/2020 \par rll linear opac unchanged 1 y ct recommended \par 3 mm lll pulm nodule nsc \par CXR \par 7/13/2020 CXR  492 7694  \par Hyperinflation suggestive of copd \par Disc atelect v fibrotic scarring r base which is new cw 2018 \par nsc subcm nodule l base\par \par ASSESSMENT RECOMMENDATION\par Will plan CT ch 4/2022\par __________\par LUNG NODULE \par _____________\par \par HABITS former pipe smoker quit 4/2020\par ct chest 4/19/2021 cw 10/9/2020 \par rll linear opac unchanged 1 y ct recommended \par 3 mm lll pulm nodule nsc \par ASSESSMENT RECOMMENDATIONS \par Plan CT around 4/2022\par ____\par COPD \par ________\par \par HABITS Pipe smoker quit 4/2020\par PFTS 10/16/2020  VC 3.78 96% FEV1 2.60 101% FEV1/FVC 69% FEF 25 75 68% \par % FRC N2 116% RV/% DLCO 94% DL/VA 86%\par INTERPRETATION  10/16/2020\par MILD OBSTRUCTION pRESERVED DIFFUSN SUGGESTS CHRONIC RONCHITIS OR ASTHMA \par Patient has hyperinflation air trapping \par MEDS 10/21/2020 Started on symbicort 80 1 puff as needed\par \par ASSESSMENT RECOMMENDATIONS\par 10/20/2021 Cont symbicort  Doing well  \par _______________\par HFPEF\par ______________\par \par Is on acei \par 6/23/2020 ECHO N lvsf \par Follows with Cardio \par Has appt Sept 2020 Dr Cuellar \par Seems ccompensated \par \par \par

## 2021-10-20 NOTE — REASON FOR VISIT
[Follow-Up] : a follow-up visit [COPD] : COPD [Pulmonary Embolism] : pulmonary embolism [Pulmonary Nodules] : pulmonary nodules [TextBox_13] : Dr. STU CRESPO
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
## 2022-01-19 ENCOUNTER — APPOINTMENT (OUTPATIENT)
Dept: PULMONOLOGY | Facility: CLINIC | Age: 77
End: 2022-01-19
Payer: MEDICARE

## 2022-01-19 VITALS
SYSTOLIC BLOOD PRESSURE: 130 MMHG | TEMPERATURE: 98 F | DIASTOLIC BLOOD PRESSURE: 70 MMHG | HEART RATE: 56 BPM | HEIGHT: 68 IN | BODY MASS INDEX: 26.52 KG/M2 | OXYGEN SATURATION: 96 % | WEIGHT: 175 LBS

## 2022-01-19 PROCEDURE — 99214 OFFICE O/P EST MOD 30 MIN: CPT

## 2022-01-19 NOTE — ASSESSMENT
[FreeTextEntry1] : \par \par \par \par \par \par \par \par \par \par \par BEST PRACTICE ISSUES \par ASSESSMENT/RECOMMENDATIONS\par _____________________\par Patient was advised  to call office and  go to ER if symptoms worsen \par ________________________________\par NEED FOR HOME OXYGEN/HOME NPPV. \par __________________________\par Not indicated\par 1/19/2022 ra rest po 96%\par \par _____________________________\par IMMUNIZATION RECOMMENDATIONS.\par ___________________ \par COMMENTS.\par 1/19/2022 up to date with covid and flu\par INFLUENZA VACCINE.\par 2021 Flu\par 2020 Flu\par 2019 Flu\par PNEUMONIA VACCINE. \par 2019 Pneumonia\par COVID VACCINE.\par 4/2021 Moderan\par 1/19/2022 Has had 3 moderan shots\par _______________\par SMOKING STATUS.\par _________\par not smoking \par QUIT SMOKING 2018 PIPE SMOKER \par _________________________\par LUNG CANCER SCREENING \par ________________\par ASSESSMENT RECOMMENDATIONS.\par DOES NOT MEET ELIGIBILITY CRITERIA AS HE WAS LIGHT SMOKER\par QUIT SMOKING 2018 PIPE SMOKER \par \par ______\par OBESITY.\par __________\par not obese \par 1/19/2022 wt 175 lb\par 1/19/2022 bmi 26\par \par ___\par PFTS. \par 10/16/2020  FVC 3.78 96% FEV1 2.60 101% FEV1/FVC 69% FEF 25 75 68% \par % FRC N2 116% RV/% DLCO 94% DL/VA 86%\par MILD OBSTRUCTION pRESERVED DIFFUSN SUGGESTS CHRONIC RONCHITIS OR ASTHMA \par Patient has hyperinflation air trapping\par _______________________\par PULMONARY EMBOLISM  4/2020  \par ______________________-\par \par V DUPLX 7/13/2020 V duplex legs  [phone] Neg-jose raul \par BNP 7/3/2020 bnp 345 \par D-dimr 7/3/2020 d-dimr 154 \par MEDS Is on eliquis for a fib\par 9/16/2020 Was asked by his cardio to continue apixaban (presumably for is a fib)\par ASSESSMENT RECOMMENDATION\par VTE recurrence is unlikely being that he is on apixaban\par ______________\par FIBROTIC SCAR R BASE ON CXR DONE 7/13/2020 (NEW FROM 2018)\par _______________\par \par FORMER SMOKER\par Used to pipe smoke \par Quit 4/2020\par CT\par ct chest 4/19/2021 cw 10/9/2020 \par rll linear opac unchanged 1 y ct recommended \par 3 mm lll pulm nodule nsc \par CXR \par 7/13/2020 CXR  [phone]  \par Hyperinflation suggestive of copd \par Disc atelect v fibrotic scarring r base which is new cw 2018 \par nsc subcm nodule l base\par \par ASSESSMENT RECOMMENDATION\par Will plan CT ch 4/2022\par __________\par LUNG NODULE \par _____________\par \par HABITS former pipe smoker quit 4/2020\par ct chest 4/19/2021 cw 10/9/2020 \par rll linear opac unchanged 1 y ct recommended \par 3 mm lll pulm nodule nsc \par ASSESSMENT RECOMMENDATIONS \par Plan CT around 4/2022\par ____\par COPD \par ________\par \par HABITS Pipe smoker quit 4/2020\par PFTS 10/16/2020  VC 3.78 96% FEV1 2.60 101% FEV1/FVC 69% FEF 25 75 68% \par % FRC N2 116% RV/% DLCO 94% DL/VA 86%\par INTERPRETATION  10/16/2020\par MILD OBSTRUCTION pRESERVED DIFFUSN SUGGESTS CHRONIC RONCHITIS OR ASTHMA \par Patient has hyperinflation air trapping \par MEDS 10/21/2020 Started on symbicort 80 1 puff as needed\par \par ASSESSMENT RECOMMENDATIONS\par 1/19/2022 uses symbicot 2p bid with spacer \par Cont \par \par

## 2022-01-19 NOTE — DISCUSSION/SUMMARY
[FreeTextEntry1] : \par \par \par ___________\par PATIENT SUMMARY\par ______________\par \par CONTACT.  [phone]  \par PCP. DR STU CRESPO\par REFERRING MD. DR STU CRESPO\par INITIAL VISIT DATE . 7/1/2020\par ALLERGY. 6/27/2020 pncl\par HISTORY. \par Pt sees me for copd He has ho PE in setting of COVID has a fib and is on eliquis  He also has abn ct chest \par ___________ \par MEDS. \par ___________\par \par 10/20/2021 \par ASA 81 DCed by Dr Granger\par \par 7/21/2021\par SYMBICORT 160 prn 2p bid Has spacer Washes mouth after use \par ELIQ 5.2 \par LISINOPRIL 20\par METFORMIN 1000\par METOPROLOL 50\par SIMVASTAT 40\par SITAGLIPTIN 50\par TAMSULOSIN .4 \par \par ___________\par PROBLEM DATA\par ______________\par     \par COPD\par 10/16/2020  FVC 3.78 96% FEV1 2.60 101% FEV1/FVC 69% FEF 25 75 68% \par % FRC N2 116% RV/% DLCO 94% DL/VA 86%\par MILD OBSTRUCTION pRESERVED DIFFUSN SUGGESTS CHRONIC RONCHITIS OR ASTHMA \par Patient has hyperinflation air trapping\par \par \par LUNG NODULE\par FIBROTIC SCAR R BASE ON CXR DONE 7/13/2020 (NEW FROM 2018)\par ct chest 4/19/2021 cw 10/9/2020 \par rll linear opac unchanged 1 y ct recommended \par 3 mm lll pulm nodule nsc \par  \par \par FORMER SMOKER\par QUIT SMOKING 2018 PIPE SMOKER \par \par COVID PNEUMONIA 4/2020  \par \par PULMONARY EMBOLISM  4/2020  \par Is on apixaban for a fib (indefinitely) \par  \par ___________________\par PERTINENT ROS/PE\par ________________\par Entries in this section were  updated today and override conflicting entries \par \par         \par DYSPNEA. \par 1/19/2022 Not sob \par MMRC SCORE.\par 1/19/2022 MMRC 1 Does not have to stop after 100 yards \par JVD.\par 1/19/2022 No\par PEDAL EDEMA.\par 1/19/2022 Tr\par RALES/CREPTS. \par 1/19/2022 No\par WHEEZE. \par 1/19/2022 No\par CLUBBING.                           \par 1/19/2022 No \par ___________________\par

## 2022-02-04 DIAGNOSIS — J98.11 ATELECTASIS: ICD-10-CM

## 2022-03-11 ENCOUNTER — RX RENEWAL (OUTPATIENT)
Age: 77
End: 2022-03-11

## 2022-03-23 ENCOUNTER — NON-APPOINTMENT (OUTPATIENT)
Age: 77
End: 2022-03-23

## 2022-03-30 ENCOUNTER — APPOINTMENT (OUTPATIENT)
Dept: CT IMAGING | Facility: CLINIC | Age: 77
End: 2022-03-30

## 2022-04-11 ENCOUNTER — RX RENEWAL (OUTPATIENT)
Age: 77
End: 2022-04-11

## 2022-04-13 ENCOUNTER — RX RENEWAL (OUTPATIENT)
Age: 77
End: 2022-04-13

## 2022-04-13 ENCOUNTER — INPATIENT (INPATIENT)
Facility: HOSPITAL | Age: 77
LOS: 4 days | Discharge: HOME HEALTH | End: 2022-04-18
Attending: INTERNAL MEDICINE | Admitting: INTERNAL MEDICINE
Payer: MEDICAID

## 2022-04-13 ENCOUNTER — APPOINTMENT (OUTPATIENT)
Dept: PULMONOLOGY | Facility: CLINIC | Age: 77
End: 2022-04-13

## 2022-04-13 VITALS
RESPIRATION RATE: 18 BRPM | HEIGHT: 68 IN | OXYGEN SATURATION: 98 % | HEART RATE: 92 BPM | DIASTOLIC BLOOD PRESSURE: 70 MMHG | WEIGHT: 163.8 LBS | TEMPERATURE: 99 F | SYSTOLIC BLOOD PRESSURE: 111 MMHG

## 2022-04-13 DIAGNOSIS — Z98.89 OTHER SPECIFIED POSTPROCEDURAL STATES: Chronic | ICD-10-CM

## 2022-04-13 DIAGNOSIS — Z90.79 ACQUIRED ABSENCE OF OTHER GENITAL ORGAN(S): Chronic | ICD-10-CM

## 2022-04-13 DIAGNOSIS — R07.9 CHEST PAIN, UNSPECIFIED: ICD-10-CM

## 2022-04-13 DIAGNOSIS — K40.90 UNILATERAL INGUINAL HERNIA, WITHOUT OBSTRUCTION OR GANGRENE, NOT SPECIFIED AS RECURRENT: Chronic | ICD-10-CM

## 2022-04-13 DIAGNOSIS — Z98.41 CATARACT EXTRACTION STATUS, RIGHT EYE: Chronic | ICD-10-CM

## 2022-04-13 LAB
GLUCOSE BLDC GLUCOMTR-MCNC: 166 MG/DL — HIGH (ref 70–99)
GLUCOSE BLDC GLUCOMTR-MCNC: 201 MG/DL — HIGH (ref 70–99)

## 2022-04-13 PROCEDURE — 99233 SBSQ HOSP IP/OBS HIGH 50: CPT

## 2022-04-13 RX ORDER — OXYCODONE AND ACETAMINOPHEN 5; 325 MG/1; MG/1
1 TABLET ORAL ONCE
Refills: 0 | Status: DISCONTINUED | OUTPATIENT
Start: 2022-04-13 | End: 2022-04-13

## 2022-04-13 RX ADMIN — OXYCODONE AND ACETAMINOPHEN 1 TABLET(S): 5; 325 TABLET ORAL at 23:41

## 2022-04-13 RX ADMIN — OXYCODONE AND ACETAMINOPHEN 1 TABLET(S): 5; 325 TABLET ORAL at 23:04
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
## 2022-04-13 NOTE — PATIENT PROFILE ADULT - FALL HARM RISK - HARM RISK INTERVENTIONS
Communicate Risk of Fall with Harm to all staff/Monitor gait and stability/Reinforce activity limits and safety measures with patient and family/Reorient to person, place and time as needed/Review medications for side effects contributing to fall risk/Sit up slowly, dangle for a short time, stand at bedside before walking/Tailored Fall Risk Interventions/Toileting schedule using arm’s reach rule for commode and bathroom/Use of alarms - bed, chair and/or voice tab/Visual Cue: Yellow wristband and red socks/Bed in lowest position, wheels locked, appropriate side rails in place/Call bell, personal items and telephone in reach/Instruct patient to call for assistance before getting out of bed or chair/Non-slip footwear when patient is out of bed/[place] to call system/Physically safe environment - no spills, clutter or unnecessary equipment/Purposeful Proactive Rounding/Room/bathroom lighting operational, light cord in reach
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
## 2022-04-14 DIAGNOSIS — I49.3 VENTRICULAR PREMATURE DEPOLARIZATION: ICD-10-CM

## 2022-04-14 DIAGNOSIS — I26.99 OTHER PULMONARY EMBOLISM WITHOUT ACUTE COR PULMONALE: ICD-10-CM

## 2022-04-14 DIAGNOSIS — I10 ESSENTIAL (PRIMARY) HYPERTENSION: ICD-10-CM

## 2022-04-14 DIAGNOSIS — S46.009A UNSPECIFIED INJURY OF MUSCLE(S) AND TENDON(S) OF THE ROTATOR CUFF OF UNSPECIFIED SHOULDER, INITIAL ENCOUNTER: ICD-10-CM

## 2022-04-14 DIAGNOSIS — Z29.9 ENCOUNTER FOR PROPHYLACTIC MEASURES, UNSPECIFIED: ICD-10-CM

## 2022-04-14 DIAGNOSIS — E11.9 TYPE 2 DIABETES MELLITUS WITHOUT COMPLICATIONS: ICD-10-CM

## 2022-04-14 DIAGNOSIS — N40.0 BENIGN PROSTATIC HYPERPLASIA WITHOUT LOWER URINARY TRACT SYMPTOMS: ICD-10-CM

## 2022-04-14 LAB
A1C WITH ESTIMATED AVERAGE GLUCOSE RESULT: 6.7 % — HIGH (ref 4–5.6)
ALBUMIN SERPL ELPH-MCNC: 3.5 G/DL — SIGNIFICANT CHANGE UP (ref 3.3–5)
ALP SERPL-CCNC: 61 U/L — SIGNIFICANT CHANGE UP (ref 40–120)
ALT FLD-CCNC: 15 U/L — SIGNIFICANT CHANGE UP (ref 4–41)
ANION GAP SERPL CALC-SCNC: 13 MMOL/L — SIGNIFICANT CHANGE UP (ref 7–14)
AST SERPL-CCNC: 14 U/L — SIGNIFICANT CHANGE UP (ref 4–40)
BILIRUB DIRECT SERPL-MCNC: <0.2 MG/DL — SIGNIFICANT CHANGE UP (ref 0–0.3)
BILIRUB INDIRECT FLD-MCNC: >0.4 MG/DL — SIGNIFICANT CHANGE UP (ref 0–1)
BILIRUB SERPL-MCNC: 0.6 MG/DL — SIGNIFICANT CHANGE UP (ref 0.2–1.2)
BLD GP AB SCN SERPL QL: NEGATIVE — SIGNIFICANT CHANGE UP
BUN SERPL-MCNC: 24 MG/DL — HIGH (ref 7–23)
CALCIUM SERPL-MCNC: 8.6 MG/DL — SIGNIFICANT CHANGE UP (ref 8.4–10.5)
CHLORIDE SERPL-SCNC: 109 MMOL/L — HIGH (ref 98–107)
CHOLEST SERPL-MCNC: 120 MG/DL — SIGNIFICANT CHANGE UP
CO2 SERPL-SCNC: 18 MMOL/L — LOW (ref 22–31)
CREAT SERPL-MCNC: 1.46 MG/DL — HIGH (ref 0.5–1.3)
EGFR: 49 ML/MIN/1.73M2 — LOW
ESTIMATED AVERAGE GLUCOSE: 146 — SIGNIFICANT CHANGE UP
GLUCOSE BLDC GLUCOMTR-MCNC: 147 MG/DL — HIGH (ref 70–99)
GLUCOSE BLDC GLUCOMTR-MCNC: 190 MG/DL — HIGH (ref 70–99)
GLUCOSE BLDC GLUCOMTR-MCNC: 205 MG/DL — HIGH (ref 70–99)
GLUCOSE SERPL-MCNC: 138 MG/DL — HIGH (ref 70–99)
HCT VFR BLD CALC: 37.4 % — LOW (ref 39–50)
HDLC SERPL-MCNC: 33 MG/DL — LOW
HGB BLD-MCNC: 12.3 G/DL — LOW (ref 13–17)
INR BLD: 1.17 RATIO — HIGH (ref 0.88–1.16)
LIPID PNL WITH DIRECT LDL SERPL: 70 MG/DL — SIGNIFICANT CHANGE UP
MAGNESIUM SERPL-MCNC: 2 MG/DL — SIGNIFICANT CHANGE UP (ref 1.6–2.6)
MCHC RBC-ENTMCNC: 32.9 GM/DL — SIGNIFICANT CHANGE UP (ref 32–36)
MCHC RBC-ENTMCNC: 34.3 PG — HIGH (ref 27–34)
MCV RBC AUTO: 104.2 FL — HIGH (ref 80–100)
NON HDL CHOLESTEROL: 87 MG/DL — SIGNIFICANT CHANGE UP
NRBC # BLD: 0 /100 WBCS — SIGNIFICANT CHANGE UP
NRBC # FLD: 0 K/UL — SIGNIFICANT CHANGE UP
PHOSPHATE SERPL-MCNC: 2.2 MG/DL — LOW (ref 2.5–4.5)
PLATELET # BLD AUTO: 223 K/UL — SIGNIFICANT CHANGE UP (ref 150–400)
POTASSIUM SERPL-MCNC: 4.1 MMOL/L — SIGNIFICANT CHANGE UP (ref 3.5–5.3)
POTASSIUM SERPL-SCNC: 4.1 MMOL/L — SIGNIFICANT CHANGE UP (ref 3.5–5.3)
PROT SERPL-MCNC: 6.3 G/DL — SIGNIFICANT CHANGE UP (ref 6–8.3)
PROTHROM AB SERPL-ACNC: 13.6 SEC — HIGH (ref 10.5–13.4)
RBC # BLD: 3.59 M/UL — LOW (ref 4.2–5.8)
RBC # FLD: 13.6 % — SIGNIFICANT CHANGE UP (ref 10.3–14.5)
RH IG SCN BLD-IMP: POSITIVE — SIGNIFICANT CHANGE UP
SARS-COV-2 RNA SPEC QL NAA+PROBE: SIGNIFICANT CHANGE UP
SODIUM SERPL-SCNC: 140 MMOL/L — SIGNIFICANT CHANGE UP (ref 135–145)
TRIGL SERPL-MCNC: 85 MG/DL — SIGNIFICANT CHANGE UP
WBC # BLD: 7.22 K/UL — SIGNIFICANT CHANGE UP (ref 3.8–10.5)
WBC # FLD AUTO: 7.22 K/UL — SIGNIFICANT CHANGE UP (ref 3.8–10.5)

## 2022-04-14 PROCEDURE — 99232 SBSQ HOSP IP/OBS MODERATE 35: CPT

## 2022-04-14 PROCEDURE — 93010 ELECTROCARDIOGRAM REPORT: CPT

## 2022-04-14 PROCEDURE — 93306 TTE W/DOPPLER COMPLETE: CPT | Mod: 26

## 2022-04-14 PROCEDURE — 99222 1ST HOSP IP/OBS MODERATE 55: CPT

## 2022-04-14 RX ORDER — ACETAMINOPHEN 500 MG
650 TABLET ORAL EVERY 6 HOURS
Refills: 0 | Status: DISCONTINUED | OUTPATIENT
Start: 2022-04-14 | End: 2022-04-18

## 2022-04-14 RX ORDER — POTASSIUM PHOSPHATE, MONOBASIC POTASSIUM PHOSPHATE, DIBASIC 236; 224 MG/ML; MG/ML
15 INJECTION, SOLUTION INTRAVENOUS ONCE
Refills: 0 | Status: COMPLETED | OUTPATIENT
Start: 2022-04-14 | End: 2022-04-14

## 2022-04-14 RX ORDER — DEXTROSE 50 % IN WATER 50 %
12.5 SYRINGE (ML) INTRAVENOUS ONCE
Refills: 0 | Status: DISCONTINUED | OUTPATIENT
Start: 2022-04-14 | End: 2022-04-18

## 2022-04-14 RX ORDER — SITAGLIPTIN 50 MG/1
1 TABLET, FILM COATED ORAL
Qty: 0 | Refills: 0 | DISCHARGE

## 2022-04-14 RX ORDER — PANTOPRAZOLE SODIUM 20 MG/1
40 TABLET, DELAYED RELEASE ORAL
Refills: 0 | Status: DISCONTINUED | OUTPATIENT
Start: 2022-04-14 | End: 2022-04-18

## 2022-04-14 RX ORDER — CILOSTAZOL 100 MG/1
1 TABLET ORAL
Qty: 0 | Refills: 0 | DISCHARGE

## 2022-04-14 RX ORDER — APIXABAN 2.5 MG/1
5 TABLET, FILM COATED ORAL EVERY 12 HOURS
Refills: 0 | Status: DISCONTINUED | OUTPATIENT
Start: 2022-04-14 | End: 2022-04-15

## 2022-04-14 RX ORDER — SODIUM CHLORIDE 9 MG/ML
1000 INJECTION, SOLUTION INTRAVENOUS
Refills: 0 | Status: DISCONTINUED | OUTPATIENT
Start: 2022-04-14 | End: 2022-04-18

## 2022-04-14 RX ORDER — OXYCODONE HYDROCHLORIDE 5 MG/1
5 TABLET ORAL EVERY 6 HOURS
Refills: 0 | Status: DISCONTINUED | OUTPATIENT
Start: 2022-04-14 | End: 2022-04-18

## 2022-04-14 RX ORDER — BUDESONIDE AND FORMOTEROL FUMARATE DIHYDRATE 160; 4.5 UG/1; UG/1
2 AEROSOL RESPIRATORY (INHALATION)
Refills: 0 | Status: DISCONTINUED | OUTPATIENT
Start: 2022-04-14 | End: 2022-04-18

## 2022-04-14 RX ORDER — INSULIN LISPRO 100/ML
VIAL (ML) SUBCUTANEOUS
Refills: 0 | Status: DISCONTINUED | OUTPATIENT
Start: 2022-04-14 | End: 2022-04-18

## 2022-04-14 RX ORDER — DEXTROSE 50 % IN WATER 50 %
25 SYRINGE (ML) INTRAVENOUS ONCE
Refills: 0 | Status: DISCONTINUED | OUTPATIENT
Start: 2022-04-14 | End: 2022-04-18

## 2022-04-14 RX ORDER — TAMSULOSIN HYDROCHLORIDE 0.4 MG/1
0.4 CAPSULE ORAL AT BEDTIME
Refills: 0 | Status: DISCONTINUED | OUTPATIENT
Start: 2022-04-14 | End: 2022-04-18

## 2022-04-14 RX ORDER — INSULIN LISPRO 100/ML
VIAL (ML) SUBCUTANEOUS AT BEDTIME
Refills: 0 | Status: DISCONTINUED | OUTPATIENT
Start: 2022-04-14 | End: 2022-04-18

## 2022-04-14 RX ORDER — BUDESONIDE AND FORMOTEROL FUMARATE DIHYDRATE 160; 4.5 UG/1; UG/1
2 AEROSOL RESPIRATORY (INHALATION)
Qty: 0 | Refills: 0 | DISCHARGE

## 2022-04-14 RX ORDER — PANTOPRAZOLE SODIUM 20 MG/1
1 TABLET, DELAYED RELEASE ORAL
Qty: 0 | Refills: 0 | DISCHARGE

## 2022-04-14 RX ORDER — METOPROLOL TARTRATE 50 MG
1 TABLET ORAL
Qty: 0 | Refills: 0 | DISCHARGE

## 2022-04-14 RX ORDER — LORATADINE 10 MG/1
1 TABLET ORAL
Qty: 0 | Refills: 0 | DISCHARGE

## 2022-04-14 RX ORDER — DEXTROSE 50 % IN WATER 50 %
15 SYRINGE (ML) INTRAVENOUS ONCE
Refills: 0 | Status: DISCONTINUED | OUTPATIENT
Start: 2022-04-14 | End: 2022-04-18

## 2022-04-14 RX ORDER — SIMVASTATIN 20 MG/1
40 TABLET, FILM COATED ORAL AT BEDTIME
Refills: 0 | Status: DISCONTINUED | OUTPATIENT
Start: 2022-04-14 | End: 2022-04-18

## 2022-04-14 RX ORDER — LISINOPRIL 2.5 MG/1
1 TABLET ORAL
Qty: 0 | Refills: 0 | DISCHARGE

## 2022-04-14 RX ORDER — SODIUM,POTASSIUM PHOSPHATES 278-250MG
1 POWDER IN PACKET (EA) ORAL THREE TIMES A DAY
Refills: 0 | Status: COMPLETED | OUTPATIENT
Start: 2022-04-14 | End: 2022-04-16

## 2022-04-14 RX ORDER — HYDROMORPHONE HYDROCHLORIDE 2 MG/ML
0.5 INJECTION INTRAMUSCULAR; INTRAVENOUS; SUBCUTANEOUS ONCE
Refills: 0 | Status: DISCONTINUED | OUTPATIENT
Start: 2022-04-14 | End: 2022-04-14

## 2022-04-14 RX ORDER — GLUCAGON INJECTION, SOLUTION 0.5 MG/.1ML
1 INJECTION, SOLUTION SUBCUTANEOUS ONCE
Refills: 0 | Status: DISCONTINUED | OUTPATIENT
Start: 2022-04-14 | End: 2022-04-18

## 2022-04-14 RX ORDER — METOPROLOL TARTRATE 50 MG
50 TABLET ORAL DAILY
Refills: 0 | Status: DISCONTINUED | OUTPATIENT
Start: 2022-04-14 | End: 2022-04-18

## 2022-04-14 RX ADMIN — TAMSULOSIN HYDROCHLORIDE 0.4 MILLIGRAM(S): 0.4 CAPSULE ORAL at 21:38

## 2022-04-14 RX ADMIN — Medication 1 PACKET(S): at 13:41

## 2022-04-14 RX ADMIN — Medication 2: at 12:55

## 2022-04-14 RX ADMIN — HYDROMORPHONE HYDROCHLORIDE 0.5 MILLIGRAM(S): 2 INJECTION INTRAMUSCULAR; INTRAVENOUS; SUBCUTANEOUS at 13:56

## 2022-04-14 RX ADMIN — OXYCODONE HYDROCHLORIDE 5 MILLIGRAM(S): 5 TABLET ORAL at 09:59

## 2022-04-14 RX ADMIN — BUDESONIDE AND FORMOTEROL FUMARATE DIHYDRATE 2 PUFF(S): 160; 4.5 AEROSOL RESPIRATORY (INHALATION) at 21:37

## 2022-04-14 RX ADMIN — Medication 50 MILLIGRAM(S): at 05:35

## 2022-04-14 RX ADMIN — APIXABAN 5 MILLIGRAM(S): 2.5 TABLET, FILM COATED ORAL at 18:16

## 2022-04-14 RX ADMIN — OXYCODONE HYDROCHLORIDE 5 MILLIGRAM(S): 5 TABLET ORAL at 18:16

## 2022-04-14 RX ADMIN — BUDESONIDE AND FORMOTEROL FUMARATE DIHYDRATE 2 PUFF(S): 160; 4.5 AEROSOL RESPIRATORY (INHALATION) at 09:59

## 2022-04-14 RX ADMIN — HYDROMORPHONE HYDROCHLORIDE 0.5 MILLIGRAM(S): 2 INJECTION INTRAMUSCULAR; INTRAVENOUS; SUBCUTANEOUS at 13:41

## 2022-04-14 RX ADMIN — SIMVASTATIN 40 MILLIGRAM(S): 20 TABLET, FILM COATED ORAL at 21:38

## 2022-04-14 RX ADMIN — PANTOPRAZOLE SODIUM 40 MILLIGRAM(S): 20 TABLET, DELAYED RELEASE ORAL at 05:33

## 2022-04-14 RX ADMIN — OXYCODONE HYDROCHLORIDE 5 MILLIGRAM(S): 5 TABLET ORAL at 10:59

## 2022-04-14 RX ADMIN — Medication 1 PACKET(S): at 21:56

## 2022-04-14 NOTE — CHART NOTE - NSCHARTNOTEFT_GEN_A_CORE
Case discussed with Dr Sharif, as Per His recommendations, Ok to Resume Eliquis as no EP  Procedure is planned.

## 2022-04-14 NOTE — H&P ADULT - NSHPREVIEWOFSYSTEMS_GEN_ALL_CORE
REVIEW OF SYSTEMS:    CONSTITUTIONAL: No weakness, fevers or chills  EYES/ENT: No visual changes;  No dysphagia  NECK: No pain or stiffness  RESPIRATORY: +mild cough. no wheezing nor hemoptysis; No shortness of breath  CARDIOVASCULAR: +chronic b/l LE edema. No chest pain or palpitations  GASTROINTESTINAL: No abdominal or epigastric pain. No nausea, vomiting, or hematemesis; No diarrhea or constipation. No melena or hematochezia.  GENITOURINARY: No dysuria, frequency or hematuria  NEUROLOGICAL: No numbness or weakness  MSK: +L lateral chest wall pain and shoulder pain  SKIN: No itching, burning, rashes, or lesions
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
## 2022-04-14 NOTE — H&P ADULT - NSICDXFAMILYHX_GEN_ALL_CORE_FT
FAMILY HISTORY:  Father  Still living? No  Family history of essential hypertension, Age at diagnosis: Age Unknown    Mother  Still living? No  Family history of stroke, Age at diagnosis: Age Unknown    Grandparent  Still living? No  Family history of essential hypertension, Age at diagnosis: Age Unknown

## 2022-04-14 NOTE — H&P ADULT - PROBLEM SELECTOR PLAN 6
no chemical ppx for now, start SCDs, diabetic diet  **will need OSH complete records in AM, records sent over did not include discharge summary, labs, imaging
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
## 2022-04-14 NOTE — H&P ADULT - PROBLEM SELECTOR PLAN 1
currently no palpitations or chest pain. Echo done at [facility] showing normal EF w/ mild MR and mild AR.   -EP consulted, possible ablation scheduled 4/15  -repldallin ford, check bmp this AM  -resume metoprolol  -hold eliquis for today
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
## 2022-04-14 NOTE — CHART NOTE - NSCHARTNOTEFT_GEN_A_CORE
Spoke to Ortho, Pt Can follow up with sports medicine as outpt for previoulsy diagnosed L Rotator cuff injury. no Need for inpt Consult.

## 2022-04-14 NOTE — H&P ADULT - NSHPPHYSICALEXAM_GEN_ALL_CORE
PHYSICAL EXAM:  GENERAL: NAD, well-developed, well-nourished  HEAD:  Atraumatic, Normocephalic  EYES: EOMI, PERRLA, conjunctiva and sclera clear  NECK: Supple, No JVD  CHEST/LUNG: +Lateral chest wall ttp, Clear to auscultation bilaterally; No wheezes, rales or rhonchi  HEART: Regular rate and rhythm; No murmurs, rubs, or gallops, (+)S1, S2  ABDOMEN: Soft, Nontender, Nondistended; Normal Bowel sounds   EXTREMITIES: +L shoulder tenderness with lmited range of motion due to pain. b/l trace pitting edema in LE.  PSYCH: normal mood, slightly tangential and w/ pressured speech  NEUROLOGY: AAOx3, non-focal  SKIN: No rashes or lesions
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
## 2022-04-14 NOTE — H&P ADULT - ASSESSMENT
75 y/o male with pmhx of HTN, DM2, COVID coagulopathy (hx of PE) on eliquis, Carotid artery disease, BPH, b/l LE venous insufficiency presents to [facility] as a transfer from [facility] for evaluation of frequent bigminy PVCs.

## 2022-04-14 NOTE — H&P ADULT - NSICDXPASTMEDICALHX_GEN_ALL_CORE_FT
PAST MEDICAL HISTORY:  Benign prostatic hyperplasia, presence of lower urinary tract symptoms unspecified, unspecified morphology     Bundle branch block, right     Cataract right eye and left eye    Diabetes mellitus, type 2 w/neurological complications    Essential hypertension with goal blood pressure less than 130/80     Hyperlipidemia, unspecified hyperlipidemia type     Other emphysema     Pipe smoker Quit 10 years ago    Testicular mass left    Umbilical hernia without obstruction and without gangrene     Unilateral recurrent inguinal hernia without obstruction or gangrene
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
## 2022-04-15 LAB
A1C WITH ESTIMATED AVERAGE GLUCOSE RESULT: 6.5 % — HIGH (ref 4–5.6)
ALBUMIN SERPL ELPH-MCNC: 3.4 G/DL — SIGNIFICANT CHANGE UP (ref 3.3–5)
ALP SERPL-CCNC: 62 U/L — SIGNIFICANT CHANGE UP (ref 40–120)
ALT FLD-CCNC: 15 U/L — SIGNIFICANT CHANGE UP (ref 4–41)
ANION GAP SERPL CALC-SCNC: 13 MMOL/L — SIGNIFICANT CHANGE UP (ref 7–14)
APPEARANCE UR: CLEAR — SIGNIFICANT CHANGE UP
APTT BLD: 32.7 SEC — SIGNIFICANT CHANGE UP (ref 27–36.3)
AST SERPL-CCNC: 15 U/L — SIGNIFICANT CHANGE UP (ref 4–40)
BACTERIA # UR AUTO: NEGATIVE — SIGNIFICANT CHANGE UP
BASOPHILS # BLD AUTO: 0.03 K/UL — SIGNIFICANT CHANGE UP (ref 0–0.2)
BASOPHILS # BLD AUTO: 0.03 K/UL — SIGNIFICANT CHANGE UP (ref 0–0.2)
BASOPHILS NFR BLD AUTO: 0.4 % — SIGNIFICANT CHANGE UP (ref 0–2)
BASOPHILS NFR BLD AUTO: 0.5 % — SIGNIFICANT CHANGE UP (ref 0–2)
BILIRUB SERPL-MCNC: 0.7 MG/DL — SIGNIFICANT CHANGE UP (ref 0.2–1.2)
BILIRUB UR-MCNC: NEGATIVE — SIGNIFICANT CHANGE UP
BLD GP AB SCN SERPL QL: NEGATIVE — SIGNIFICANT CHANGE UP
BUN SERPL-MCNC: 22 MG/DL — SIGNIFICANT CHANGE UP (ref 7–23)
CALCIUM SERPL-MCNC: 8.6 MG/DL — SIGNIFICANT CHANGE UP (ref 8.4–10.5)
CHLORIDE SERPL-SCNC: 106 MMOL/L — SIGNIFICANT CHANGE UP (ref 98–107)
CO2 SERPL-SCNC: 20 MMOL/L — LOW (ref 22–31)
COLOR SPEC: YELLOW — SIGNIFICANT CHANGE UP
CREAT ?TM UR-MCNC: 164 MG/DL — SIGNIFICANT CHANGE UP
CREAT SERPL-MCNC: 1.36 MG/DL — HIGH (ref 0.5–1.3)
DIFF PNL FLD: ABNORMAL
EGFR: 54 ML/MIN/1.73M2 — LOW
EOSINOPHIL # BLD AUTO: 0.13 K/UL — SIGNIFICANT CHANGE UP (ref 0–0.5)
EOSINOPHIL # BLD AUTO: 0.24 K/UL — SIGNIFICANT CHANGE UP (ref 0–0.5)
EOSINOPHIL NFR BLD AUTO: 1.9 % — SIGNIFICANT CHANGE UP (ref 0–6)
EOSINOPHIL NFR BLD AUTO: 3.7 % — SIGNIFICANT CHANGE UP (ref 0–6)
EPI CELLS # UR: 1 /HPF — SIGNIFICANT CHANGE UP (ref 0–5)
ESTIMATED AVERAGE GLUCOSE: 140 — SIGNIFICANT CHANGE UP
GLUCOSE BLDC GLUCOMTR-MCNC: 136 MG/DL — HIGH (ref 70–99)
GLUCOSE BLDC GLUCOMTR-MCNC: 145 MG/DL — HIGH (ref 70–99)
GLUCOSE SERPL-MCNC: 133 MG/DL — HIGH (ref 70–99)
GLUCOSE UR QL: NEGATIVE — SIGNIFICANT CHANGE UP
HCT VFR BLD CALC: 37.7 % — LOW (ref 39–50)
HCT VFR BLD CALC: 38.7 % — LOW (ref 39–50)
HGB BLD-MCNC: 12.3 G/DL — LOW (ref 13–17)
HGB BLD-MCNC: 12.6 G/DL — LOW (ref 13–17)
HYALINE CASTS # UR AUTO: 1 /LPF — SIGNIFICANT CHANGE UP (ref 0–7)
IANC: 3.79 K/UL — SIGNIFICANT CHANGE UP (ref 1.8–7.4)
IANC: 4.58 K/UL — SIGNIFICANT CHANGE UP (ref 1.8–7.4)
IMM GRANULOCYTES NFR BLD AUTO: 0.5 % — SIGNIFICANT CHANGE UP (ref 0–1.5)
IMM GRANULOCYTES NFR BLD AUTO: 0.6 % — SIGNIFICANT CHANGE UP (ref 0–1.5)
INR BLD: 1.43 RATIO — HIGH (ref 0.88–1.16)
KETONES UR-MCNC: NEGATIVE — SIGNIFICANT CHANGE UP
LEUKOCYTE ESTERASE UR-ACNC: NEGATIVE — SIGNIFICANT CHANGE UP
LYMPHOCYTES # BLD AUTO: 1.17 K/UL — SIGNIFICANT CHANGE UP (ref 1–3.3)
LYMPHOCYTES # BLD AUTO: 1.53 K/UL — SIGNIFICANT CHANGE UP (ref 1–3.3)
LYMPHOCYTES # BLD AUTO: 16.9 % — SIGNIFICANT CHANGE UP (ref 13–44)
LYMPHOCYTES # BLD AUTO: 23.6 % — SIGNIFICANT CHANGE UP (ref 13–44)
MAGNESIUM SERPL-MCNC: 2 MG/DL — SIGNIFICANT CHANGE UP (ref 1.6–2.6)
MCHC RBC-ENTMCNC: 32.6 GM/DL — SIGNIFICANT CHANGE UP (ref 32–36)
MCHC RBC-ENTMCNC: 32.6 GM/DL — SIGNIFICANT CHANGE UP (ref 32–36)
MCHC RBC-ENTMCNC: 33.9 PG — SIGNIFICANT CHANGE UP (ref 27–34)
MCHC RBC-ENTMCNC: 34.4 PG — HIGH (ref 27–34)
MCV RBC AUTO: 103.9 FL — HIGH (ref 80–100)
MCV RBC AUTO: 105.7 FL — HIGH (ref 80–100)
MONOCYTES # BLD AUTO: 0.87 K/UL — SIGNIFICANT CHANGE UP (ref 0–0.9)
MONOCYTES # BLD AUTO: 0.99 K/UL — HIGH (ref 0–0.9)
MONOCYTES NFR BLD AUTO: 13.4 % — SIGNIFICANT CHANGE UP (ref 2–14)
MONOCYTES NFR BLD AUTO: 14.3 % — HIGH (ref 2–14)
NEUTROPHILS # BLD AUTO: 3.79 K/UL — SIGNIFICANT CHANGE UP (ref 1.8–7.4)
NEUTROPHILS # BLD AUTO: 4.58 K/UL — SIGNIFICANT CHANGE UP (ref 1.8–7.4)
NEUTROPHILS NFR BLD AUTO: 58.3 % — SIGNIFICANT CHANGE UP (ref 43–77)
NEUTROPHILS NFR BLD AUTO: 65.9 % — SIGNIFICANT CHANGE UP (ref 43–77)
NITRITE UR-MCNC: NEGATIVE — SIGNIFICANT CHANGE UP
NRBC # BLD: 0 /100 WBCS — SIGNIFICANT CHANGE UP
NRBC # BLD: 0 /100 WBCS — SIGNIFICANT CHANGE UP
NRBC # FLD: 0 K/UL — SIGNIFICANT CHANGE UP
NRBC # FLD: 0 K/UL — SIGNIFICANT CHANGE UP
PH UR: 6 — SIGNIFICANT CHANGE UP (ref 5–8)
PHOSPHATE SERPL-MCNC: 3.1 MG/DL — SIGNIFICANT CHANGE UP (ref 2.5–4.5)
PLATELET # BLD AUTO: 221 K/UL — SIGNIFICANT CHANGE UP (ref 150–400)
PLATELET # BLD AUTO: 223 K/UL — SIGNIFICANT CHANGE UP (ref 150–400)
POTASSIUM SERPL-MCNC: 4.3 MMOL/L — SIGNIFICANT CHANGE UP (ref 3.5–5.3)
POTASSIUM SERPL-SCNC: 4.3 MMOL/L — SIGNIFICANT CHANGE UP (ref 3.5–5.3)
PROT ?TM UR-MCNC: 68 MG/DL — SIGNIFICANT CHANGE UP
PROT SERPL-MCNC: 6.4 G/DL — SIGNIFICANT CHANGE UP (ref 6–8.3)
PROT UR-MCNC: ABNORMAL
PROT/CREAT UR-RTO: 0.4 RATIO — HIGH (ref 0–0.2)
PROTHROM AB SERPL-ACNC: 16.7 SEC — HIGH (ref 10.5–13.4)
RBC # BLD: 3.63 M/UL — LOW (ref 4.2–5.8)
RBC # BLD: 3.66 M/UL — LOW (ref 4.2–5.8)
RBC # FLD: 13.3 % — SIGNIFICANT CHANGE UP (ref 10.3–14.5)
RBC # FLD: 13.3 % — SIGNIFICANT CHANGE UP (ref 10.3–14.5)
RBC CASTS # UR COMP ASSIST: 4 /HPF — SIGNIFICANT CHANGE UP (ref 0–4)
RH IG SCN BLD-IMP: POSITIVE — SIGNIFICANT CHANGE UP
SODIUM SERPL-SCNC: 139 MMOL/L — SIGNIFICANT CHANGE UP (ref 135–145)
SP GR SPEC: >1.05 (ref 1–1.05)
UROBILINOGEN FLD QL: SIGNIFICANT CHANGE UP
WBC # BLD: 6.49 K/UL — SIGNIFICANT CHANGE UP (ref 3.8–10.5)
WBC # BLD: 6.94 K/UL — SIGNIFICANT CHANGE UP (ref 3.8–10.5)
WBC # FLD AUTO: 6.49 K/UL — SIGNIFICANT CHANGE UP (ref 3.8–10.5)
WBC # FLD AUTO: 6.94 K/UL — SIGNIFICANT CHANGE UP (ref 3.8–10.5)
WBC UR QL: 1 /HPF — SIGNIFICANT CHANGE UP (ref 0–5)

## 2022-04-15 PROCEDURE — 99223 1ST HOSP IP/OBS HIGH 75: CPT | Mod: 57

## 2022-04-15 PROCEDURE — 71045 X-RAY EXAM CHEST 1 VIEW: CPT | Mod: 26,59

## 2022-04-15 PROCEDURE — 71101 X-RAY EXAM UNILAT RIBS/CHEST: CPT | Mod: 26,LT

## 2022-04-15 PROCEDURE — 71270 CT THORAX DX C-/C+: CPT | Mod: 26

## 2022-04-15 PROCEDURE — 99291 CRITICAL CARE FIRST HOUR: CPT | Mod: GC

## 2022-04-15 PROCEDURE — 99222 1ST HOSP IP/OBS MODERATE 55: CPT | Mod: GC

## 2022-04-15 RX ORDER — POLYETHYLENE GLYCOL 3350 17 G/17G
17 POWDER, FOR SOLUTION ORAL DAILY
Refills: 0 | Status: DISCONTINUED | OUTPATIENT
Start: 2022-04-15 | End: 2022-04-18

## 2022-04-15 RX ORDER — SENNA PLUS 8.6 MG/1
2 TABLET ORAL AT BEDTIME
Refills: 0 | Status: DISCONTINUED | OUTPATIENT
Start: 2022-04-15 | End: 2022-04-18

## 2022-04-15 RX ORDER — IPRATROPIUM/ALBUTEROL SULFATE 18-103MCG
3 AEROSOL WITH ADAPTER (GRAM) INHALATION ONCE
Refills: 0 | Status: COMPLETED | OUTPATIENT
Start: 2022-04-15 | End: 2022-04-15

## 2022-04-15 RX ORDER — IPRATROPIUM/ALBUTEROL SULFATE 18-103MCG
3 AEROSOL WITH ADAPTER (GRAM) INHALATION EVERY 6 HOURS
Refills: 0 | Status: DISCONTINUED | OUTPATIENT
Start: 2022-04-15 | End: 2022-04-18

## 2022-04-15 RX ORDER — FUROSEMIDE 40 MG
40 TABLET ORAL DAILY
Refills: 0 | Status: DISCONTINUED | OUTPATIENT
Start: 2022-04-15 | End: 2022-04-18

## 2022-04-15 RX ORDER — APIXABAN 2.5 MG/1
5 TABLET, FILM COATED ORAL EVERY 12 HOURS
Refills: 0 | Status: DISCONTINUED | OUTPATIENT
Start: 2022-04-15 | End: 2022-04-18

## 2022-04-15 RX ORDER — ALBUTEROL 90 UG/1
2 AEROSOL, METERED ORAL ONCE
Refills: 0 | Status: DISCONTINUED | OUTPATIENT
Start: 2022-04-15 | End: 2022-04-15

## 2022-04-15 RX ADMIN — Medication 1 PACKET(S): at 22:19

## 2022-04-15 RX ADMIN — Medication 1 PACKET(S): at 13:54

## 2022-04-15 RX ADMIN — Medication 3 MILLILITER(S): at 06:57

## 2022-04-15 RX ADMIN — APIXABAN 5 MILLIGRAM(S): 2.5 TABLET, FILM COATED ORAL at 18:51

## 2022-04-15 RX ADMIN — OXYCODONE HYDROCHLORIDE 5 MILLIGRAM(S): 5 TABLET ORAL at 10:54

## 2022-04-15 RX ADMIN — Medication 1 PACKET(S): at 05:37

## 2022-04-15 RX ADMIN — SIMVASTATIN 40 MILLIGRAM(S): 20 TABLET, FILM COATED ORAL at 22:20

## 2022-04-15 RX ADMIN — PANTOPRAZOLE SODIUM 40 MILLIGRAM(S): 20 TABLET, DELAYED RELEASE ORAL at 05:37

## 2022-04-15 RX ADMIN — Medication 3 MILLILITER(S): at 14:16

## 2022-04-15 RX ADMIN — OXYCODONE HYDROCHLORIDE 5 MILLIGRAM(S): 5 TABLET ORAL at 09:54

## 2022-04-15 RX ADMIN — TAMSULOSIN HYDROCHLORIDE 0.4 MILLIGRAM(S): 0.4 CAPSULE ORAL at 22:19

## 2022-04-15 RX ADMIN — BUDESONIDE AND FORMOTEROL FUMARATE DIHYDRATE 2 PUFF(S): 160; 4.5 AEROSOL RESPIRATORY (INHALATION) at 22:19

## 2022-04-15 RX ADMIN — Medication 50 MILLIGRAM(S): at 05:37

## 2022-04-15 RX ADMIN — BUDESONIDE AND FORMOTEROL FUMARATE DIHYDRATE 2 PUFF(S): 160; 4.5 AEROSOL RESPIRATORY (INHALATION) at 09:51

## 2022-04-15 RX ADMIN — APIXABAN 5 MILLIGRAM(S): 2.5 TABLET, FILM COATED ORAL at 05:37

## 2022-04-15 RX ADMIN — SENNA PLUS 2 TABLET(S): 8.6 TABLET ORAL at 22:19
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
## 2022-04-15 NOTE — CONSULT NOTE ADULT - ATTENDING COMMENTS
Patient is a 77 yo M w/ HTN, T2DM, COVID coagulopathy (hx of PE), Afib (on Eliquis), mild COPD, carotid artery disease, BPH, b/l LE venous insufficiency who was transferred to [facility] on 4/13 from [facility] for frequent PVCs.  Pulmonary called for intermittent hypoxemia.    Patient seen and examined at bedside. Was taken off oxygen during evaluation and patient did not desaturate less than 90%. Patient in no respiratory distress but does have chest pain with deep inspiration. Patient able to cough up phelgm which is white. History of COPD but PFTs only show a mild form of pulmonary obstruction. CT scan does not show pulmonary hemorrhage.    At this time the patient's intermittent hypoxemia is likely due to atelectasis from splint breathing. Would suggest:    - Out of bed to chair  - Incentive spirometry  - Duonebs Q6 PRN for shortness of breath  - Please give Acapella to help bring up phlegm  - Pain management for pleuritic chest pain  - Cont. Symbicort    With deep inspiration and getting out of bed to chair the patient should resolve his atelectasis and improve his oxygenation. Patient can follow up with Dr. Trujillo after discharge for continued evaluation of pulmonary nodule.     Thank you for your consult. Please call back if you have further questions regarding the care of this patient.
76 year old man with HTN, DM2, COVID in the past, PE on AC, BPH, here for evaluation of arrhythmia MICU evaluation for hypoxic respiratory failure that resolved with 1 liter nasal cannula,  No need for MICU at this time.
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
## 2022-04-15 NOTE — PROVIDER CONTACT NOTE (OTHER) - ACTION/TREATMENT ORDERED:
As per PA, retake BP in 2 hours
As per PA, continue to monitor at this time and will follow up
As per PA, continue to monitor on room air and  at this time. Notify provider for any other changes in status. Pending f/u with CXR
As per PA, will contact CT surgery for evaluation and to f/u with CXR and to administer PRN's pain medication as patient needs it.
As per PA, continue to monitor at this time

## 2022-04-15 NOTE — PROVIDER CONTACT NOTE (OTHER) - ASSESSMENT
Patient noticeably short of breath, O2 currently 92%, all other vitals stable as per flow sheet. Complains of pain on left side. PRN oxycodone given for 5/10 pain Patient noticeably short of breath, O2 currently 92%, all other vitals stable as per flow sheet. Complains of pain on left side. PRN oxycodone given for 5/10 pain. Patient is favoring breathing on right side. Mild belly breathing noted. PA made aware
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
## 2022-04-15 NOTE — PROGRESS NOTE ADULT - SUBJECTIVE AND OBJECTIVE BOX
Date of Service  : 04-15-22 @ 19:18    INTERVAL HPI/OVERNIGHT EVENTS: It hurts .   Vital Signs Last 24 Hrs  T(C): 36.8 (15 Apr 2022 13:55), Max: 36.8 (15 Apr 2022 09:50)  T(F): 98.3 (15 Apr 2022 13:55), Max: 98.3 (15 Apr 2022 13:55)  HR: 64 (15 Apr 2022 15:30) (64 - 85)  BP: 113/48 (15 Apr 2022 15:30) (113/48 - 141/72)  BP(mean): --  RR: 20 (15 Apr 2022 13:55) (18 - 32)  SpO2: 98% (15 Apr 2022 14:17) (88% - 100%)  I&O's Summary    MEDICATIONS  (STANDING):  apixaban 5 milliGRAM(s) Oral every 12 hours  budesonide 160 MICROgram(s)/formoterol 4.5 MICROgram(s) Inhaler 2 Puff(s) Inhalation two times a day  dextrose 5%. 1000 milliLiter(s) (50 mL/Hr) IV Continuous <Continuous>  dextrose 5%. 1000 milliLiter(s) (100 mL/Hr) IV Continuous <Continuous>  dextrose 50% Injectable 25 Gram(s) IV Push once  dextrose 50% Injectable 12.5 Gram(s) IV Push once  dextrose 50% Injectable 25 Gram(s) IV Push once  furosemide    Tablet 40 milliGRAM(s) Oral daily  glucagon  Injectable 1 milliGRAM(s) IntraMuscular once  insulin lispro (ADMELOG) corrective regimen sliding scale   SubCutaneous three times a day before meals  insulin lispro (ADMELOG) corrective regimen sliding scale   SubCutaneous at bedtime  metoprolol succinate ER 50 milliGRAM(s) Oral daily  pantoprazole    Tablet 40 milliGRAM(s) Oral before breakfast  polyethylene glycol 3350 17 Gram(s) Oral daily  potassium phosphate / sodium phosphate Powder (PHOS-NaK) 1 Packet(s) Oral three times a day  senna 2 Tablet(s) Oral at bedtime  simvastatin 40 milliGRAM(s) Oral at bedtime  tamsulosin 0.4 milliGRAM(s) Oral at bedtime    MEDICATIONS  (PRN):  acetaminophen     Tablet .. 650 milliGRAM(s) Oral every 6 hours PRN Mild Pain (1 - 3)  albuterol/ipratropium for Nebulization 3 milliLiter(s) Nebulizer every 6 hours PRN Shortness of Breath and/or Wheezing  dextrose Oral Gel 15 Gram(s) Oral once PRN Blood Glucose LESS THAN 70 milliGRAM(s)/deciliter  oxyCODONE    IR 5 milliGRAM(s) Oral every 6 hours PRN Moderate Pain (4 - 6)    LABS:                        12.6   6.94  )-----------( 223      ( 15 Apr 2022 11:10 )             38.7     04-15    139  |  106  |  22  ----------------------------<  133<H>  4.3   |  20<L>  |  1.36<H>    Ca    8.6      15 Apr 2022 08:02  Phos  3.1     04-15  Mg     2.00     04-15    TPro  6.4  /  Alb  3.4  /  TBili  0.7  /  DBili  x   /  AST  15  /  ALT  15  /  AlkPhos  62  04-15    PT/INR - ( 15 Apr 2022 08:02 )   PT: 16.7 sec;   INR: 1.43 ratio         PTT - ( 15 Apr 2022 08:02 )  PTT:32.7 sec  Urinalysis Basic - ( 15 Apr 2022 15:41 )    Color: Yellow / Appearance: Clear / SG: >1.050 / pH: x  Gluc: x / Ketone: Negative  / Bili: Negative / Urobili: <2 mg/dL   Blood: x / Protein: 100 mg/dL / Nitrite: Negative   Leuk Esterase: Negative / RBC: 4 /HPF / WBC 1 /HPF   Sq Epi: x / Non Sq Epi: 1 /HPF / Bacteria: Negative      CAPILLARY BLOOD GLUCOSE  136 (15 Apr 2022 08:44)      POCT Blood Glucose.: 136 mg/dL (15 Apr 2022 17:57)  POCT Blood Glucose.: 145 mg/dL (15 Apr 2022 12:41)  POCT Blood Glucose.: 190 mg/dL (14 Apr 2022 21:23)        Urinalysis Basic - ( 15 Apr 2022 15:41 )    Color: Yellow / Appearance: Clear / SG: >1.050 / pH: x  Gluc: x / Ketone: Negative  / Bili: Negative / Urobili: <2 mg/dL   Blood: x / Protein: 100 mg/dL / Nitrite: Negative   Leuk Esterase: Negative / RBC: 4 /HPF / WBC 1 /HPF   Sq Epi: x / Non Sq Epi: 1 /HPF / Bacteria: Negative      REVIEW OF SYSTEMS:  CONSTITUTIONAL: No fever, weight loss, or fatigue  NECK: No pain or stiffness  RESPIRATORY: No cough, wheezing, chills or hemoptysis; but  shortness of breath  CARDIOVASCULAR: No chest pain, palpitations, dizziness, or leg swelling  GASTROINTESTINAL: No abdominal or epigastric pain. No nausea, vomiting, or hematemesis; No diarrhea or constipation. No melena or hematochezia.  GENITOURINARY: No dysuria, frequency, hematuria, or incontinence  NEUROLOGICAL: No headaches, memory loss, loss of strength, numbness, or tremors      Consultant(s) Notes Reviewed:  [x ] YES  [ ] NO    PHYSICAL EXAM:  GENERAL: NAD, Oxygen   HEAD:  Atraumatic, Normocephalic  NECK: Supple, No JVD, Normal thyroid  NERVOUS SYSTEM:  Alert & Oriented X3, No focal deficit   CHEST/LUNG: Good air entry bilateral with rhonchi   HEART: Regular rate and rhythm; No murmurs, rubs, or gallops  ABDOMEN: Soft, Nontender, Nondistended; Bowel sounds present  EXTREMITIES:  2+ Peripheral Pulses, No clubbing, cyanosis, or edema    Care Discussed with Consultants/Other Providers [ x] YES  [ ] NO
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
## 2022-04-15 NOTE — PROVIDER CONTACT NOTE (OTHER) - RECOMMENDATIONS
As per PA, retake BP in 2 hours
As per PA, continue to monitor at this time
As per PA, continue to monitor on room air and  at this time. Notify provider for any other changes in status. Pending f/u with CXR
As per PA, will contact CT surgery for evaluation and to f/u with CXR and to administer PRN's pain medication as patient needs it.
As per PA, continue to monitor at this time and will follow up

## 2022-04-15 NOTE — CONSULT NOTE ADULT - ASSESSMENT
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 MICU consulted for hypoxic respiratory failure.    #Hypoxic respiratory failure 2/2 possible pulm edema vs post-covid lung  -currently on 1L NC (saturating at 96%) with rr low 20s, patient endorses mild SOB - no accessory muscle use  -thoracic surgery following - agree with CT chest  -Wheezes appreciated - recommend duonebs PRN  -consider cardiac etiology    Patient is NOT a MICU candidate at this time

## 2022-04-15 NOTE — PROVIDER CONTACT NOTE (CHANGE IN STATUS NOTIFICATION) - SITUATION
pt exhibiting new onset tachypnea. New onset wheezing bilaterally. Labored breathing. Patient also very sweaty. No fever ( 97.5 oral, 100.1 rectal). Patient remains NSR on tele with rody ku.
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
## 2022-04-15 NOTE — CONSULT NOTE ADULT - CONSULT REASON
Hypoxemia
Clearance for contrast
EPS with PVCs
r/o intrapulmonary hemorrhage
Abnormal ECG
Abnormal EKG
hypoxemia
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
## 2022-04-15 NOTE — CHART NOTE - NSCHARTNOTEFT_GEN_A_CORE
RN notified writer that pt is exhibiting new onset tachypnea w/ audible wheezing & associated diaphoresis. VSS, with the exception of RR of 28 & pulse ox of 93% on RA.    Reviewed chart and assessed pt at bedside. Pt is able to talk in complete sentences but w/ notable increased respiratory rate & wheezing. Continues to endorse Lt-sided rib pain similar to admission. Denies any chills, lightheadedness, weakness, dizziness, history of asthma/COPD, midsternal CP, abd pain, n/v/c/d.    General: A&Ox3.  CV: RRR, +S1, S2.  Resp: Tachypneic, diffuse wheezing noted w/ expiration, no rhonchi or rales. Otherwise equal breath sounds b/l.   Abd: soft, NT/ND, no guarding or tenderness to palpation.  LE: no edema, clubbing, or cyanosis, +stasis pigmentation.    Plan:  >Urgent bedside CXR ordered  >STAT Duoneb ordered  >continuous pulse ox ordered  >consider NC if pulse ox worsens  >Day team made aware

## 2022-04-15 NOTE — PROVIDER CONTACT NOTE (OTHER) - ASSESSMENT
Patient noted to have tremors within all extremities when asked to raise them. Patient stated has never had tremors before. Patient is A&O x4

## 2022-04-15 NOTE — CONSULT NOTE ADULT - CONSULT REQUESTED DATE/TIME
13-Apr-2022 20:51
15-Apr-2022
15-Apr-2022 16:37
14-Apr-2022 12:06
15-Apr-2022 12:03
15-Apr-2022 11:00
14-Apr-2022 13:07

## 2022-04-15 NOTE — CONSULT NOTE ADULT - ASSESSMENT
Patient is a 75 yo M w/ HTN, T2DM, COVID coagulopathy (hx of PE), Afib (on Eliquis), mild COPD, carotid artery disease, BPH, b/l LE venous insufficiency who was transferred to [facility] on 4/13 from [facility] for frequent PVCs.  Pulmonary called for intermittent hypoxemia.    #Intermittent hypoxemia - Likely 2/2 atelectasis 2/2 splinting due to pain. Endorses not being ambulatory since admission and endorses pleuritic CP still. Interview with patient on RA with SO2 95 to 97% with pressured speech. CT chest here showed few ill-defined nodules in b/l upper lobes, new compared to previous exam.; few small nodules in FIORELLA and b/l lower lobes; few RLL tree-in-bud opacities; small b/l pleural effusions are.   - Recommend incentive spirometer   - Recommend airway clearance with Duonebs q6h and Aerobija  - c/w Symbicort 160/4.5  - OOB to chair and ambulation as tolerated with pain control  - Wean supplemental O2    Pulmonary will sign off. Please call back as needed.    Danyel Mccall MD  Pulmonary & Critical Care Fellow  [phone]

## 2022-04-15 NOTE — PROGRESS NOTE ADULT - SUBJECTIVE AND OBJECTIVE BOX
Date of service: 04/15/22    chief complaint: PVC's    extended hpi: 77 y/o male with pmhx of HTN, DM2, COVID coagulopathy (hx of PE) on eliquis, Carotid artery disease, BPH, b/l LE venous insufficiency presents to [facility] as a transfer from [facility] for evaluation of frequent PVC's.     S: no chest pain, sob, palpitations; ros otherwise negative.     Review of Systems:   Constitutional: [ ] fevers, [ ] chills.   Skin: [ ] dry skin. [ ] rashes.  Psychiatric: [ ] depression, [ ] anxiety.   Gastrointestinal: [ ] BRBPR, [ ] melena.   Neurological: [ ] confusion. [ ] seizures. [ ] shuffling gait.   Ears,Nose,Mouth and Throat: [ ] ear pain [ ] sore throat.   Eyes: [ ] diplopia.   Respiratory: [ ] hemoptysis. [ ] shortness of breath  Cardiovascular: See HPI above  Hematologic/Lymphatic: [ ] anemia. [ ] painful nodes. [ ] prolonged bleeding.   Genitourinary: [ ] hematuria. [ ] flank pain.   Endocrine: [ ] significant change in weight. [ ] intolerance to heat and cold.     Review of systems [x ] otherwise negative, [ ] otherwise unable to obtain    FH: no family history of sudden cardiac death in first degree relatives    SH: [ ] tobacco, [ ] alcohol, [ ] drugs    acetaminophen     Tablet .. 650 milliGRAM(s) Oral every 6 hours PRN  apixaban 5 milliGRAM(s) Oral every 12 hours  budesonide 160 MICROgram(s)/formoterol 4.5 MICROgram(s) Inhaler 2 Puff(s) Inhalation two times a day  dextrose 5%. 1000 milliLiter(s) IV Continuous <Continuous>  dextrose 5%. 1000 milliLiter(s) IV Continuous <Continuous>  dextrose 50% Injectable 25 Gram(s) IV Push once  dextrose 50% Injectable 12.5 Gram(s) IV Push once  dextrose 50% Injectable 25 Gram(s) IV Push once  dextrose Oral Gel 15 Gram(s) Oral once PRN  glucagon  Injectable 1 milliGRAM(s) IntraMuscular once  insulin lispro (ADMELOG) corrective regimen sliding scale   SubCutaneous three times a day before meals  insulin lispro (ADMELOG) corrective regimen sliding scale   SubCutaneous at bedtime  metoprolol succinate ER 50 milliGRAM(s) Oral daily  oxyCODONE    IR 5 milliGRAM(s) Oral every 6 hours PRN  pantoprazole    Tablet 40 milliGRAM(s) Oral before breakfast  potassium phosphate / sodium phosphate Powder (PHOS-NaK) 1 Packet(s) Oral three times a day  simvastatin 40 milliGRAM(s) Oral at bedtime  tamsulosin 0.4 milliGRAM(s) Oral at bedtime                            12.3   6.49  )-----------( 221      ( 15 Apr 2022 08:02 )             37.7       04-14    140  |  109<H>  |  24<H>  ----------------------------<  138<H>  4.1   |  18<L>  |  1.46<H>    Ca    8.6      14 Apr 2022 07:22  Phos  2.2     04-14  Mg     2.00     04-14    TPro  6.3  /  Alb  3.5  /  TBili  0.6  /  DBili  <0.2  /  AST  14  /  ALT  15  /  AlkPhos  61  04-14            T(C): 36.4 (04-15-22 @ 05:35), Max: 36.9 (04-14-22 @ 12:09)  HR: 81 (04-15-22 @ 05:35) (66 - 98)  BP: 138/62 (04-15-22 @ 05:35) (132/68 - 139/80)  RR: 18 (04-15-22 @ 05:35) (18 - 18)  SpO2: 95% (04-15-22 @ 05:35) (95% - 100%)  Wt(kg): --    I&O's Summary      General: Well nourished in no acute distress. Alert and Oriented * 3.   Head: Normocephalic and atraumatic.   Neck: No JVD. No bruits. Supple. Does not appear to be enlarged.   Cardiovascular: + S1,S2 ; RRR Soft systolic murmur at the left lower sternal border. No rubs noted.    Lungs: CTA b/l. No rhonchi, rales or wheezes.   Abdomen: + BS, soft. Non tender. Non distended. No rebound. No guarding.   Extremities: No clubbing/cyanosis/edema.   Neurologic: Moves all four extremities. Full range of motion.   Skin: Warm and moist. The patient's skin has normal elasticity and good skin turgor.   Psychiatric: Appropriate mood and affect.  Musculoskeletal: Normal range of motion, normal strength    Tele: kings JUDD     A/P:  77 y/o male with pmhx of HTN, DM2, COVID coagulopathy (hx of PE) on eliquis, Carotid artery disease, BPH, b/l LE venous insufficiency presents to [facility] as a transfer from [facility] for evaluation of frequent PVC's.    -Pt. with no palpitations or syncope  -Recent NST demonstrated no ischemia or infarct  -TTE in Feb of 2022 with normal LVEF  -continue with beta blockers  -management of history of PE per medicine team   -EP consult with Dr. Riggs/H[facility] for further workup of PVC's appreciated - no plans for ablation at this time per EP     Harsh Sierra MD

## 2022-04-15 NOTE — PROVIDER CONTACT NOTE (OTHER) - REASON
Patient /45, asymptomatic
Patient repeat /48
Patient O2 saturation decreased to 88% on room air
Patient tachypneic to 30's on room air and favoring breathing on right side
Patient noted to have tremors within all extremities when asked to raise them
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
## 2022-04-15 NOTE — CONSULT NOTE ADULT - ASSESSMENT
Pt is a 77 y/o male with pmhx of HTN, DM2, COVID coagulopathy (hx of PE) on eliquis, Carotid artery disease, BPH, b/l LE venous insufficiency presents to [facility] as a transfer from [facility] for evaluation of frequent bigminy PVCs  CKD stage 3 a     1 Renal -No renal objection to CTA   Start Lasix 40mg po qd  Check UA and quantify proteinuria burden  2 CVS-EF normal.  Should the bblocker be increased   3 Pulm-CT chest dose not appear to be in failure but awaiting official read  Cont Nebs     Sayed [facility]   [employer] [facility]   [phone]

## 2022-04-15 NOTE — PROVIDER CONTACT NOTE (OTHER) - SITUATION
Patient repeat /48
Patient O2 saturation decreased to 88% on room air
Patient noted to have tremors within all extremities when asked to raise them
Patient /45, asymptomatic
Patient tachypneic to 30's on room air and favoring breathing on right side
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
## 2022-04-15 NOTE — CHART NOTE - NSCHARTNOTEFT_GEN_A_CORE
CT chest revealed "no mediastinal hemorrhage is noted. Ill-defined nodules are present in both upper lobes. Follow-up CT scan is recommended in 3 months to ensure resolution. Small bilateral pleural effusions." Reviewed with Dr. Sharif, CT negative for hemorrhage and repeat CBC stable, ok to resume Eliquis at this time, c/w , follow up pulmonary recs, MICU appreciated- not a candidate at this time. Will continue to monitor closely, follow up pulm recommendations.

## 2022-04-15 NOTE — PROVIDER CONTACT NOTE (OTHER) - ASSESSMENT
Patient mildly short of breath, O2 88% on room air, corrected on own on room air O2 staying between 92-94%.

## 2022-04-16 LAB
ANION GAP SERPL CALC-SCNC: 12 MMOL/L — SIGNIFICANT CHANGE UP (ref 7–14)
BUN SERPL-MCNC: 24 MG/DL — HIGH (ref 7–23)
CALCIUM SERPL-MCNC: 9 MG/DL — SIGNIFICANT CHANGE UP (ref 8.4–10.5)
CHLORIDE SERPL-SCNC: 106 MMOL/L — SIGNIFICANT CHANGE UP (ref 98–107)
CO2 SERPL-SCNC: 24 MMOL/L — SIGNIFICANT CHANGE UP (ref 22–31)
CREAT SERPL-MCNC: 1.38 MG/DL — HIGH (ref 0.5–1.3)
EGFR: 53 ML/MIN/1.73M2 — LOW
GLUCOSE BLDC GLUCOMTR-MCNC: 136 MG/DL — HIGH (ref 70–99)
GLUCOSE BLDC GLUCOMTR-MCNC: 164 MG/DL — HIGH (ref 70–99)
GLUCOSE BLDC GLUCOMTR-MCNC: 169 MG/DL — HIGH (ref 70–99)
GLUCOSE BLDC GLUCOMTR-MCNC: 192 MG/DL — HIGH (ref 70–99)
GLUCOSE SERPL-MCNC: 135 MG/DL — HIGH (ref 70–99)
HCT VFR BLD CALC: 40.7 % — SIGNIFICANT CHANGE UP (ref 39–50)
HGB BLD-MCNC: 13.1 G/DL — SIGNIFICANT CHANGE UP (ref 13–17)
MAGNESIUM SERPL-MCNC: 2.2 MG/DL — SIGNIFICANT CHANGE UP (ref 1.6–2.6)
MCHC RBC-ENTMCNC: 32.2 GM/DL — SIGNIFICANT CHANGE UP (ref 32–36)
MCHC RBC-ENTMCNC: 33.6 PG — SIGNIFICANT CHANGE UP (ref 27–34)
MCV RBC AUTO: 104.4 FL — HIGH (ref 80–100)
NRBC # BLD: 0 /100 WBCS — SIGNIFICANT CHANGE UP
NRBC # FLD: 0 K/UL — SIGNIFICANT CHANGE UP
PHOSPHATE SERPL-MCNC: 3.9 MG/DL — SIGNIFICANT CHANGE UP (ref 2.5–4.5)
PLATELET # BLD AUTO: 238 K/UL — SIGNIFICANT CHANGE UP (ref 150–400)
POTASSIUM SERPL-MCNC: 4.2 MMOL/L — SIGNIFICANT CHANGE UP (ref 3.5–5.3)
POTASSIUM SERPL-SCNC: 4.2 MMOL/L — SIGNIFICANT CHANGE UP (ref 3.5–5.3)
RBC # BLD: 3.9 M/UL — LOW (ref 4.2–5.8)
RBC # FLD: 13.2 % — SIGNIFICANT CHANGE UP (ref 10.3–14.5)
SODIUM SERPL-SCNC: 142 MMOL/L — SIGNIFICANT CHANGE UP (ref 135–145)
WBC # BLD: 6.21 K/UL — SIGNIFICANT CHANGE UP (ref 3.8–10.5)
WBC # FLD AUTO: 6.21 K/UL — SIGNIFICANT CHANGE UP (ref 3.8–10.5)

## 2022-04-16 RX ADMIN — APIXABAN 5 MILLIGRAM(S): 2.5 TABLET, FILM COATED ORAL at 17:17

## 2022-04-16 RX ADMIN — Medication 1: at 13:06

## 2022-04-16 RX ADMIN — OXYCODONE HYDROCHLORIDE 5 MILLIGRAM(S): 5 TABLET ORAL at 02:07

## 2022-04-16 RX ADMIN — OXYCODONE HYDROCHLORIDE 5 MILLIGRAM(S): 5 TABLET ORAL at 12:18

## 2022-04-16 RX ADMIN — Medication 1: at 18:31

## 2022-04-16 RX ADMIN — Medication 40 MILLIGRAM(S): at 05:37

## 2022-04-16 RX ADMIN — SENNA PLUS 2 TABLET(S): 8.6 TABLET ORAL at 21:51

## 2022-04-16 RX ADMIN — OXYCODONE HYDROCHLORIDE 5 MILLIGRAM(S): 5 TABLET ORAL at 13:18

## 2022-04-16 RX ADMIN — SIMVASTATIN 40 MILLIGRAM(S): 20 TABLET, FILM COATED ORAL at 21:51

## 2022-04-16 RX ADMIN — TAMSULOSIN HYDROCHLORIDE 0.4 MILLIGRAM(S): 0.4 CAPSULE ORAL at 21:51

## 2022-04-16 RX ADMIN — APIXABAN 5 MILLIGRAM(S): 2.5 TABLET, FILM COATED ORAL at 05:38

## 2022-04-16 RX ADMIN — OXYCODONE HYDROCHLORIDE 5 MILLIGRAM(S): 5 TABLET ORAL at 01:27

## 2022-04-16 RX ADMIN — BUDESONIDE AND FORMOTEROL FUMARATE DIHYDRATE 2 PUFF(S): 160; 4.5 AEROSOL RESPIRATORY (INHALATION) at 21:50

## 2022-04-16 RX ADMIN — Medication 50 MILLIGRAM(S): at 05:38

## 2022-04-16 RX ADMIN — Medication 1 PACKET(S): at 05:38

## 2022-04-16 RX ADMIN — PANTOPRAZOLE SODIUM 40 MILLIGRAM(S): 20 TABLET, DELAYED RELEASE ORAL at 06:04

## 2022-04-16 RX ADMIN — BUDESONIDE AND FORMOTEROL FUMARATE DIHYDRATE 2 PUFF(S): 160; 4.5 AEROSOL RESPIRATORY (INHALATION) at 12:11
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
## 2022-04-16 NOTE — CHART NOTE - NSCHARTNOTESELECT_GEN_ALL_CORE
Event Note
Tachypnea/Event Note
Thoracic Surgery

## 2022-04-16 NOTE — PROGRESS NOTE ADULT - SUBJECTIVE AND OBJECTIVE BOX
Date of Service  : 04-16-22 @ 14:14    INTERVAL HPI/OVERNIGHT EVENTS: I feel fine.   Vital Signs Last 24 Hrs  T(C): 36.6 (16 Apr 2022 12:11), Max: 36.9 (15 Apr 2022 23:13)  T(F): 97.9 (16 Apr 2022 12:11), Max: 98.5 (15 Apr 2022 23:13)  HR: 62 (16 Apr 2022 12:11) (55 - 64)  BP: 135/61 (16 Apr 2022 12:11) (113/48 - 160/69)  BP(mean): --  RR: 18 (16 Apr 2022 12:11) (18 - 20)  SpO2: 95% (16 Apr 2022 12:11) (95% - 100%)  I&O's Summary    15 Apr 2022 07:01  -  16 Apr 2022 07:00  --------------------------------------------------------  IN: 0 mL / OUT: 300 mL / NET: -300 mL      MEDICATIONS  (STANDING):  apixaban 5 milliGRAM(s) Oral every 12 hours  budesonide 160 MICROgram(s)/formoterol 4.5 MICROgram(s) Inhaler 2 Puff(s) Inhalation two times a day  dextrose 5%. 1000 milliLiter(s) (50 mL/Hr) IV Continuous <Continuous>  dextrose 5%. 1000 milliLiter(s) (100 mL/Hr) IV Continuous <Continuous>  dextrose 50% Injectable 25 Gram(s) IV Push once  dextrose 50% Injectable 12.5 Gram(s) IV Push once  dextrose 50% Injectable 25 Gram(s) IV Push once  furosemide    Tablet 40 milliGRAM(s) Oral daily  glucagon  Injectable 1 milliGRAM(s) IntraMuscular once  insulin lispro (ADMELOG) corrective regimen sliding scale   SubCutaneous three times a day before meals  insulin lispro (ADMELOG) corrective regimen sliding scale   SubCutaneous at bedtime  metoprolol succinate ER 50 milliGRAM(s) Oral daily  pantoprazole    Tablet 40 milliGRAM(s) Oral before breakfast  polyethylene glycol 3350 17 Gram(s) Oral daily  senna 2 Tablet(s) Oral at bedtime  simvastatin 40 milliGRAM(s) Oral at bedtime  tamsulosin 0.4 milliGRAM(s) Oral at bedtime    MEDICATIONS  (PRN):  acetaminophen     Tablet .. 650 milliGRAM(s) Oral every 6 hours PRN Mild Pain (1 - 3)  albuterol/ipratropium for Nebulization 3 milliLiter(s) Nebulizer every 6 hours PRN Shortness of Breath and/or Wheezing  dextrose Oral Gel 15 Gram(s) Oral once PRN Blood Glucose LESS THAN 70 milliGRAM(s)/deciliter  oxyCODONE    IR 5 milliGRAM(s) Oral every 6 hours PRN Moderate Pain (4 - 6)    LABS:                        13.1   6.21  )-----------( 238      ( 16 Apr 2022 08:37 )             40.7     04-16    142  |  106  |  24<H>  ----------------------------<  135<H>  4.2   |  24  |  1.38<H>    Ca    9.0      16 Apr 2022 08:37  Phos  3.9     04-16  Mg     2.20     04-16    TPro  6.4  /  Alb  3.4  /  TBili  0.7  /  DBili  x   /  AST  15  /  ALT  15  /  AlkPhos  62  04-15    PT/INR - ( 15 Apr 2022 08:02 )   PT: 16.7 sec;   INR: 1.43 ratio         PTT - ( 15 Apr 2022 08:02 )  PTT:32.7 sec  Urinalysis Basic - ( 15 Apr 2022 15:41 )    Color: Yellow / Appearance: Clear / SG: >1.050 / pH: x  Gluc: x / Ketone: Negative  / Bili: Negative / Urobili: <2 mg/dL   Blood: x / Protein: 100 mg/dL / Nitrite: Negative   Leuk Esterase: Negative / RBC: 4 /HPF / WBC 1 /HPF   Sq Epi: x / Non Sq Epi: 1 /HPF / Bacteria: Negative      CAPILLARY BLOOD GLUCOSE  149 (15 Apr 2022 22:12)      POCT Blood Glucose.: 169 mg/dL (16 Apr 2022 12:17)  POCT Blood Glucose.: 136 mg/dL (16 Apr 2022 08:37)  POCT Blood Glucose.: 136 mg/dL (15 Apr 2022 17:57)        Urinalysis Basic - ( 15 Apr 2022 15:41 )    Color: Yellow / Appearance: Clear / SG: >1.050 / pH: x  Gluc: x / Ketone: Negative  / Bili: Negative / Urobili: <2 mg/dL   Blood: x / Protein: 100 mg/dL / Nitrite: Negative   Leuk Esterase: Negative / RBC: 4 /HPF / WBC 1 /HPF   Sq Epi: x / Non Sq Epi: 1 /HPF / Bacteria: Negative      REVIEW OF SYSTEMS:  CONSTITUTIONAL: No fever, weight loss, or fatigue  EYES: No eye pain, visual disturbances, or discharge  ENMT:  No difficulty hearing, tinnitus, vertigo; No sinus or throat pain  NECK: No pain or stiffness  RESPIRATORY: No cough, wheezing, chills or hemoptysis; No shortness of breath  CARDIOVASCULAR: No chest pain, palpitations, dizziness, or leg swelling  GASTROINTESTINAL: No abdominal or epigastric pain. No nausea, vomiting, or hematemesis; No diarrhea or constipation. No melena or hematochezia.  GENITOURINARY: No dysuria, frequency, hematuria, or incontinence      Consultant(s) Notes Reviewed:  [x ] YES  [ ] NO    PHYSICAL EXAM:  GENERAL: NAD,   HEAD:  Atraumatic, Normocephalic  NECK: Supple, No JVD, Normal thyroid  NERVOUS SYSTEM:  Alert & Oriented X3, No focal deficit   CHEST/LUNG: Good air entry bilateral with no  rales, rhonchi, wheezing, or rubs  HEART: Regular rate and rhythm; No murmurs, rubs, or gallops  ABDOMEN: Soft, Nontender, Nondistended; Bowel sounds present  EXTREMITIES:  2+ Peripheral Pulses, No clubbing, cyanosis, or edema    Care Discussed with Consultants/Other Providers [ x] YES  [ ] NO

## 2022-04-16 NOTE — CHART NOTE - NSCHARTNOTEFT_GEN_A_CORE
No hemorrhage or acute findings requiring intervention on CT scan. Please recontact Thoracic Surgery as needed. Thank you.    Raza Jordan PGY5  Thoracic Surgery - [phone]

## 2022-04-16 NOTE — PROGRESS NOTE ADULT - ASSESSMENT
ASSESSMENT/PLAN:  Pt is a 75 y/o male with pmhx of HTN, DM2, COVID coagulopathy (hx of PE) on eliquis, Carotid artery disease, BPH, b/l LE venous insufficiency presents to [facility] as a transfer from [facility] for evaluation of frequent bigminy PVCs  CKD stage 3 a     1 Renal - S/p CTA, trend renal function, continue Lasix 40mg po qd  2 CVS-EF normal.  Should the bblocker be increased   3 Pulm-CT chest dose not appear to be in failure. Cont Nebs     Bev Noel, NP-C  Health system  [phone]

## 2022-04-16 NOTE — PROGRESS NOTE ADULT - SUBJECTIVE AND OBJECTIVE BOX
Date of service: 04/16/22    chief complaint: PVC's    extended hpi: 77 y/o male with pmhx of HTN, DM2, COVID coagulopathy (hx of PE) on eliquis, Carotid artery disease, BPH, b/l LE venous insufficiency presents to [facility] as a transfer from [facility] for evaluation of frequent PVC's.     S: pt seen and examined, no complaints, ROS - .     Review of Systems:   Constitutional: [ ] fevers, [ ] chills.   Skin: [ ] dry skin. [ ] rashes.  Psychiatric: [ ] depression, [ ] anxiety.   Gastrointestinal: [ ] BRBPR, [ ] melena.   Neurological: [ ] confusion. [ ] seizures. [ ] shuffling gait.   Ears,Nose,Mouth and Throat: [ ] ear pain [ ] sore throat.   Eyes: [ ] diplopia.   Respiratory: [ ] hemoptysis. [ ] shortness of breath  Cardiovascular: See HPI above  Hematologic/Lymphatic: [ ] anemia. [ ] painful nodes. [ ] prolonged bleeding.   Genitourinary: [ ] hematuria. [ ] flank pain.   Endocrine: [ ] significant change in weight. [ ] intolerance to heat and cold.     Review of systems [x ] otherwise negative, [ ] otherwise unable to obtain    FH: no family history of sudden cardiac death in first degree relatives    SH: [ ] tobacco, [ ] alcohol, [ ] drugs         acetaminophen     Tablet .. 650 milliGRAM(s) Oral every 6 hours PRN  albuterol/ipratropium for Nebulization 3 milliLiter(s) Nebulizer every 6 hours PRN  apixaban 5 milliGRAM(s) Oral every 12 hours  budesonide 160 MICROgram(s)/formoterol 4.5 MICROgram(s) Inhaler 2 Puff(s) Inhalation two times a day  dextrose 5%. 1000 milliLiter(s) IV Continuous <Continuous>  dextrose 5%. 1000 milliLiter(s) IV Continuous <Continuous>  dextrose 50% Injectable 25 Gram(s) IV Push once  dextrose 50% Injectable 12.5 Gram(s) IV Push once  dextrose 50% Injectable 25 Gram(s) IV Push once  dextrose Oral Gel 15 Gram(s) Oral once PRN  furosemide    Tablet 40 milliGRAM(s) Oral daily  glucagon  Injectable 1 milliGRAM(s) IntraMuscular once  insulin lispro (ADMELOG) corrective regimen sliding scale   SubCutaneous three times a day before meals  insulin lispro (ADMELOG) corrective regimen sliding scale   SubCutaneous at bedtime  metoprolol succinate ER 50 milliGRAM(s) Oral daily  oxyCODONE    IR 5 milliGRAM(s) Oral every 6 hours PRN  pantoprazole    Tablet 40 milliGRAM(s) Oral before breakfast  polyethylene glycol 3350 17 Gram(s) Oral daily  senna 2 Tablet(s) Oral at bedtime  simvastatin 40 milliGRAM(s) Oral at bedtime  tamsulosin 0.4 milliGRAM(s) Oral at bedtime                            13.1   6.21  )-----------( 238      ( 16 Apr 2022 08:37 )             40.7       Hemoglobin: 13.1 g/dL (04-16 @ 08:37)  Hemoglobin: 12.6 g/dL (04-15 @ 11:10)  Hemoglobin: 12.3 g/dL (04-15 @ 08:02)  Hemoglobin: 12.3 g/dL (04-14 @ 07:22)      04-16    142  |  106  |  24<H>  ----------------------------<  135<H>  4.2   |  24  |  1.38<H>    Ca    9.0      16 Apr 2022 08:37  Phos  3.9     04-16  Mg     2.20     04-16    TPro  6.4  /  Alb  3.4  /  TBili  0.7  /  DBili  x   /  AST  15  /  ALT  15  /  AlkPhos  62  04-15    Creatinine Trend: 1.38<--, 1.36<--, 1.46<--    COAGS:           T(C): 36.1 (04-16-22 @ 05:29), Max: 36.9 (04-15-22 @ 23:13)  HR: 60 (04-16-22 @ 05:29) (55 - 76)  BP: 124/62 (04-16-22 @ 05:29) (113/48 - 160/69)  RR: 18 (04-16-22 @ 05:29) (18 - 25)  SpO2: 96% (04-16-22 @ 05:29) (96% - 100%)  Wt(kg): --    I&O's Summary    15 Apr 2022 07:01  -  16 Apr 2022 07:00  --------------------------------------------------------  IN: 0 mL / OUT: 300 mL / NET: -300 mL        General: Well nourished in no acute distress. Alert and Oriented * 3.   Head: Normocephalic and atraumatic.   Neck: No JVD. No bruits. Supple. Does not appear to be enlarged.   Cardiovascular: + S1,S2 ; RRR Soft systolic murmur at the left lower sternal border. No rubs noted.    Lungs: CTA b/l. No rhonchi, rales or wheezes.   Abdomen: + BS, soft. Non tender. Non distended. No rebound. No guarding.   Extremities: No clubbing/cyanosis/edema.   Neurologic: Moves all four extremities. Full range of motion.   Skin: Warm and moist. The patient's skin has normal elasticity and good skin turgor.   Psychiatric: Appropriate mood and affect.  Musculoskeletal: Normal range of motion, normal strength    Tele: kings JUDD     A/P:  77 y/o male with pmhx of HTN, DM2, COVID coagulopathy (hx of PE) on eliquis, Carotid artery disease, BPH, b/l LE venous insufficiency presents to [facility] as a transfer from [facility] for evaluation of frequent PVC's.    -Pt. with no palpitations or syncope  -Recent NST demonstrated no ischemia or infarct  -TTE in Feb of 2022 with normal LVEF  -continue with beta blockers  -management of history of PE per medicine team   -EP consult with Dr. Riggs/Harmony for further workup of PVC's appreciated - no plans for ablation at this time per EP
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
## 2022-04-17 LAB
ANION GAP SERPL CALC-SCNC: 16 MMOL/L — HIGH (ref 7–14)
BUN SERPL-MCNC: 30 MG/DL — HIGH (ref 7–23)
CALCIUM SERPL-MCNC: 8.7 MG/DL — SIGNIFICANT CHANGE UP (ref 8.4–10.5)
CHLORIDE SERPL-SCNC: 106 MMOL/L — SIGNIFICANT CHANGE UP (ref 98–107)
CO2 SERPL-SCNC: 21 MMOL/L — LOW (ref 22–31)
CREAT SERPL-MCNC: 1.33 MG/DL — HIGH (ref 0.5–1.3)
EGFR: 55 ML/MIN/1.73M2 — LOW
GLUCOSE BLDC GLUCOMTR-MCNC: 142 MG/DL — HIGH (ref 70–99)
GLUCOSE BLDC GLUCOMTR-MCNC: 154 MG/DL — HIGH (ref 70–99)
GLUCOSE BLDC GLUCOMTR-MCNC: 167 MG/DL — HIGH (ref 70–99)
GLUCOSE BLDC GLUCOMTR-MCNC: 213 MG/DL — HIGH (ref 70–99)
GLUCOSE SERPL-MCNC: 120 MG/DL — HIGH (ref 70–99)
HCT VFR BLD CALC: 37.4 % — LOW (ref 39–50)
HGB BLD-MCNC: 12.6 G/DL — LOW (ref 13–17)
MAGNESIUM SERPL-MCNC: 2.1 MG/DL — SIGNIFICANT CHANGE UP (ref 1.6–2.6)
MCHC RBC-ENTMCNC: 33.6 PG — SIGNIFICANT CHANGE UP (ref 27–34)
MCHC RBC-ENTMCNC: 33.7 GM/DL — SIGNIFICANT CHANGE UP (ref 32–36)
MCV RBC AUTO: 99.7 FL — SIGNIFICANT CHANGE UP (ref 80–100)
NRBC # BLD: 0 /100 WBCS — SIGNIFICANT CHANGE UP
NRBC # FLD: 0 K/UL — SIGNIFICANT CHANGE UP
OB PNL STL: NEGATIVE — SIGNIFICANT CHANGE UP
PHOSPHATE SERPL-MCNC: 3.3 MG/DL — SIGNIFICANT CHANGE UP (ref 2.5–4.5)
PLATELET # BLD AUTO: 272 K/UL — SIGNIFICANT CHANGE UP (ref 150–400)
POTASSIUM SERPL-MCNC: 3.8 MMOL/L — SIGNIFICANT CHANGE UP (ref 3.5–5.3)
POTASSIUM SERPL-SCNC: 3.8 MMOL/L — SIGNIFICANT CHANGE UP (ref 3.5–5.3)
RBC # BLD: 3.75 M/UL — LOW (ref 4.2–5.8)
RBC # FLD: 12.9 % — SIGNIFICANT CHANGE UP (ref 10.3–14.5)
SODIUM SERPL-SCNC: 143 MMOL/L — SIGNIFICANT CHANGE UP (ref 135–145)
WBC # BLD: 7.13 K/UL — SIGNIFICANT CHANGE UP (ref 3.8–10.5)
WBC # FLD AUTO: 7.13 K/UL — SIGNIFICANT CHANGE UP (ref 3.8–10.5)

## 2022-04-17 RX ADMIN — PANTOPRAZOLE SODIUM 40 MILLIGRAM(S): 20 TABLET, DELAYED RELEASE ORAL at 05:20

## 2022-04-17 RX ADMIN — APIXABAN 5 MILLIGRAM(S): 2.5 TABLET, FILM COATED ORAL at 05:20

## 2022-04-17 RX ADMIN — Medication 50 MILLIGRAM(S): at 10:09

## 2022-04-17 RX ADMIN — Medication 1: at 18:27

## 2022-04-17 RX ADMIN — Medication 40 MILLIGRAM(S): at 05:19

## 2022-04-17 RX ADMIN — BUDESONIDE AND FORMOTEROL FUMARATE DIHYDRATE 2 PUFF(S): 160; 4.5 AEROSOL RESPIRATORY (INHALATION) at 21:34

## 2022-04-17 RX ADMIN — Medication 1: at 13:25

## 2022-04-17 RX ADMIN — BUDESONIDE AND FORMOTEROL FUMARATE DIHYDRATE 2 PUFF(S): 160; 4.5 AEROSOL RESPIRATORY (INHALATION) at 10:02

## 2022-04-17 RX ADMIN — SENNA PLUS 2 TABLET(S): 8.6 TABLET ORAL at 21:36

## 2022-04-17 RX ADMIN — SIMVASTATIN 40 MILLIGRAM(S): 20 TABLET, FILM COATED ORAL at 21:36

## 2022-04-17 RX ADMIN — APIXABAN 5 MILLIGRAM(S): 2.5 TABLET, FILM COATED ORAL at 17:47

## 2022-04-17 RX ADMIN — TAMSULOSIN HYDROCHLORIDE 0.4 MILLIGRAM(S): 0.4 CAPSULE ORAL at 21:36
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
## 2022-04-17 NOTE — PROGRESS NOTE ADULT - SUBJECTIVE AND OBJECTIVE BOX
Date of Service  : 04-17-22 @ 13:46    INTERVAL HPI/OVERNIGHT EVENTS: I feel fine.   Vital Signs Last 24 Hrs  T(C): 36.7 (17 Apr 2022 10:09), Max: 36.7 (17 Apr 2022 10:09)  T(F): 98 (17 Apr 2022 10:09), Max: 98 (17 Apr 2022 10:09)  HR: 68 (17 Apr 2022 10:09) (52 - 68)  BP: 132/72 (17 Apr 2022 10:09) (120/68 - 148/80)  BP(mean): --  RR: 18 (17 Apr 2022 10:09) (18 - 18)  SpO2: 94% (17 Apr 2022 10:09) (93% - 95%)  I&O's Summary    16 Apr 2022 07:01  -  17 Apr 2022 07:00  --------------------------------------------------------  IN: 0 mL / OUT: 920 mL / NET: -920 mL      MEDICATIONS  (STANDING):  apixaban 5 milliGRAM(s) Oral every 12 hours  budesonide 160 MICROgram(s)/formoterol 4.5 MICROgram(s) Inhaler 2 Puff(s) Inhalation two times a day  dextrose 5%. 1000 milliLiter(s) (50 mL/Hr) IV Continuous <Continuous>  dextrose 5%. 1000 milliLiter(s) (100 mL/Hr) IV Continuous <Continuous>  dextrose 50% Injectable 12.5 Gram(s) IV Push once  dextrose 50% Injectable 25 Gram(s) IV Push once  dextrose 50% Injectable 25 Gram(s) IV Push once  furosemide    Tablet 40 milliGRAM(s) Oral daily  glucagon  Injectable 1 milliGRAM(s) IntraMuscular once  insulin lispro (ADMELOG) corrective regimen sliding scale   SubCutaneous three times a day before meals  insulin lispro (ADMELOG) corrective regimen sliding scale   SubCutaneous at bedtime  metoprolol succinate ER 50 milliGRAM(s) Oral daily  pantoprazole    Tablet 40 milliGRAM(s) Oral before breakfast  polyethylene glycol 3350 17 Gram(s) Oral daily  senna 2 Tablet(s) Oral at bedtime  simvastatin 40 milliGRAM(s) Oral at bedtime  tamsulosin 0.4 milliGRAM(s) Oral at bedtime    MEDICATIONS  (PRN):  acetaminophen     Tablet .. 650 milliGRAM(s) Oral every 6 hours PRN Mild Pain (1 - 3)  albuterol/ipratropium for Nebulization 3 milliLiter(s) Nebulizer every 6 hours PRN Shortness of Breath and/or Wheezing  dextrose Oral Gel 15 Gram(s) Oral once PRN Blood Glucose LESS THAN 70 milliGRAM(s)/deciliter  oxyCODONE    IR 5 milliGRAM(s) Oral every 6 hours PRN Moderate Pain (4 - 6)    LABS:                        12.6   7.13  )-----------( 272      ( 17 Apr 2022 07:23 )             37.4     04-17    143  |  106  |  30<H>  ----------------------------<  120<H>  3.8   |  21<L>  |  1.33<H>    Ca    8.7      17 Apr 2022 07:23  Phos  3.3     04-17  Mg     2.10     04-17        Urinalysis Basic - ( 15 Apr 2022 15:41 )    Color: Yellow / Appearance: Clear / SG: >1.050 / pH: x  Gluc: x / Ketone: Negative  / Bili: Negative / Urobili: <2 mg/dL   Blood: x / Protein: 100 mg/dL / Nitrite: Negative   Leuk Esterase: Negative / RBC: 4 /HPF / WBC 1 /HPF   Sq Epi: x / Non Sq Epi: 1 /HPF / Bacteria: Negative      CAPILLARY BLOOD GLUCOSE      POCT Blood Glucose.: 154 mg/dL (17 Apr 2022 12:35)  POCT Blood Glucose.: 142 mg/dL (17 Apr 2022 08:23)  POCT Blood Glucose.: 192 mg/dL (16 Apr 2022 22:06)  POCT Blood Glucose.: 164 mg/dL (16 Apr 2022 17:50)        Urinalysis Basic - ( 15 Apr 2022 15:41 )    Color: Yellow / Appearance: Clear / SG: >1.050 / pH: x  Gluc: x / Ketone: Negative  / Bili: Negative / Urobili: <2 mg/dL   Blood: x / Protein: 100 mg/dL / Nitrite: Negative   Leuk Esterase: Negative / RBC: 4 /HPF / WBC 1 /HPF   Sq Epi: x / Non Sq Epi: 1 /HPF / Bacteria: Negative      REVIEW OF SYSTEMS:  CONSTITUTIONAL: No fever, weight loss, or fatigue  EYES: No eye pain, visual disturbances, or discharge  ENMT:  No difficulty hearing, tinnitus, vertigo; No sinus or throat pain  NECK: No pain or stiffness  RESPIRATORY: No cough, wheezing, chills or hemoptysis; No shortness of breath  CARDIOVASCULAR: No chest pain, palpitations, dizziness, or leg swelling  GASTROINTESTINAL: No abdominal or epigastric pain. No nausea, vomiting, or hematemesis; No diarrhea or constipation. No melena or hematochezia.  GENITOURINARY: No dysuria, frequency, hematuria, or incontinence  NEUROLOGICAL: No headaches, memory loss, loss of strength, numbness, or tremors      Consultant(s) Notes Reviewed:  [x ] YES  [ ] NO    PHYSICAL EXAM:  GENERAL: NAD, well-groomed, well-developed,not in any distress ,  HEAD:  Atraumatic, Normocephalic  NECK: Supple, No JVD, Normal thyroid  NERVOUS SYSTEM:  Alert & Oriented X3, No focal deficit   CHEST/LUNG: Good air entry bilateral with no  rales, rhonchi, wheezing, or rubs  HEART: Regular rate and rhythm; No murmurs, rubs, or gallops  ABDOMEN: Soft, Nontender, Nondistended; Bowel sounds present  EXTREMITIES:  2+ Peripheral Pulses, No clubbing, cyanosis, or edema  SKIN: No rashes or lesions    Care Discussed with Consultants/Other Providers [ x] YES  [ ] NO

## 2022-04-17 NOTE — PROGRESS NOTE ADULT - SUBJECTIVE AND OBJECTIVE BOX
Date of service: 04/17/22    chief complaint: PVC's    extended hpi: 77 y/o male with pmhx of HTN, DM2, COVID coagulopathy (hx of PE) on eliquis, Carotid artery disease, BPH, b/l LE venous insufficiency presents to [facility] as a transfer from [facility] for evaluation of frequent PVC's.     S: pt seen and examined, no complaints, ROS - .     Review of Systems:   Constitutional: [ ] fevers, [ ] chills.   Skin: [ ] dry skin. [ ] rashes.  Psychiatric: [ ] depression, [ ] anxiety.   Gastrointestinal: [ ] BRBPR, [ ] melena.   Neurological: [ ] confusion. [ ] seizures. [ ] shuffling gait.   Ears,Nose,Mouth and Throat: [ ] ear pain [ ] sore throat.   Eyes: [ ] diplopia.   Respiratory: [ ] hemoptysis. [ ] shortness of breath  Cardiovascular: See HPI above  Hematologic/Lymphatic: [ ] anemia. [ ] painful nodes. [ ] prolonged bleeding.   Genitourinary: [ ] hematuria. [ ] flank pain.   Endocrine: [ ] significant change in weight. [ ] intolerance to heat and cold.     Review of systems [x ] otherwise negative, [ ] otherwise unable to obtain    FH: no family history of sudden cardiac death in first degree relatives    SH: [ ] tobacco, [ ] alcohol, [ ] drugs           acetaminophen     Tablet .. 650 milliGRAM(s) Oral every 6 hours PRN  albuterol/ipratropium for Nebulization 3 milliLiter(s) Nebulizer every 6 hours PRN  apixaban 5 milliGRAM(s) Oral every 12 hours  budesonide 160 MICROgram(s)/formoterol 4.5 MICROgram(s) Inhaler 2 Puff(s) Inhalation two times a day  dextrose 5%. 1000 milliLiter(s) IV Continuous <Continuous>  dextrose 5%. 1000 milliLiter(s) IV Continuous <Continuous>  dextrose 50% Injectable 25 Gram(s) IV Push once  dextrose 50% Injectable 12.5 Gram(s) IV Push once  dextrose 50% Injectable 25 Gram(s) IV Push once  dextrose Oral Gel 15 Gram(s) Oral once PRN  furosemide    Tablet 40 milliGRAM(s) Oral daily  glucagon  Injectable 1 milliGRAM(s) IntraMuscular once  insulin lispro (ADMELOG) corrective regimen sliding scale   SubCutaneous three times a day before meals  insulin lispro (ADMELOG) corrective regimen sliding scale   SubCutaneous at bedtime  metoprolol succinate ER 50 milliGRAM(s) Oral daily  oxyCODONE    IR 5 milliGRAM(s) Oral every 6 hours PRN  pantoprazole    Tablet 40 milliGRAM(s) Oral before breakfast  polyethylene glycol 3350 17 Gram(s) Oral daily  senna 2 Tablet(s) Oral at bedtime  simvastatin 40 milliGRAM(s) Oral at bedtime  tamsulosin 0.4 milliGRAM(s) Oral at bedtime                            12.6   7.13  )-----------( 272      ( 17 Apr 2022 07:23 )             37.4       Hemoglobin: 12.6 g/dL (04-17 @ 07:23)  Hemoglobin: 13.1 g/dL (04-16 @ 08:37)  Hemoglobin: 12.6 g/dL (04-15 @ 11:10)  Hemoglobin: 12.3 g/dL (04-15 @ 08:02)  Hemoglobin: 12.3 g/dL (04-14 @ 07:22)      04-17    143  |  106  |  30<H>  ----------------------------<  120<H>  3.8   |  21<L>  |  1.33<H>    Ca    8.7      17 Apr 2022 07:23  Phos  3.3     04-17  Mg     2.10     04-17      Creatinine Trend: 1.33<--, 1.38<--, 1.36<--, 1.46<--    COAGS:           T(C): 36.6 (04-17-22 @ 05:20), Max: 36.6 (04-16-22 @ 12:11)  HR: 52 (04-17-22 @ 05:20) (52 - 65)  BP: 120/68 (04-17-22 @ 05:20) (120/68 - 148/80)  RR: 18 (04-17-22 @ 05:20) (18 - 18)  SpO2: 93% (04-17-22 @ 05:20) (93% - 95%)  Wt(kg): --    I&O's Summary    16 Apr 2022 07:01  -  17 Apr 2022 07:00  --------------------------------------------------------  IN: 0 mL / OUT: 920 mL / NET: -920 mL      General: Well nourished in no acute distress. Alert and Oriented * 3.   Head: Normocephalic and atraumatic.   Neck: No JVD. No bruits. Supple. Does not appear to be enlarged.   Cardiovascular: + S1,S2 ; RRR Soft systolic murmur at the left lower sternal border. No rubs noted.    Lungs: CTA b/l. No rhonchi, rales or wheezes.   Abdomen: + BS, soft. Non tender. Non distended. No rebound. No guarding.   Extremities: No clubbing/cyanosis/edema.   Neurologic: Moves all four extremities. Full range of motion.   Skin: Warm and moist. The patient's skin has normal elasticity and good skin turgor.   Psychiatric: Appropriate mood and affect.  Musculoskeletal: Normal range of motion, normal strength    Tele: kings JUDD     A/P:  77 y/o male with pmhx of HTN, DM2, COVID coagulopathy (hx of PE) on eliquis, Carotid artery disease, BPH, b/l LE venous insufficiency presents to [facility] as a transfer from [facility] for evaluation of frequent PVC's.    -Pt. with no palpitations or syncope  -Recent NST demonstrated no ischemia or infarct  -TTE in Feb of 2022 with normal LVEF  -continue with beta blockers  -management of history of PE per medicine team   -EP consult with Dr. Riggs/Harmony for further workup of PVC's appreciated - no plans for ablation at this time per EP
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
## 2022-04-17 NOTE — PROGRESS NOTE ADULT - ASSESSMENT
Attending addendum:   Agree with above   No plans for ablation at this time  Treatment of history of PE per primary team    Harsh Sierra MD
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
## 2022-04-18 ENCOUNTER — TRANSCRIPTION ENCOUNTER (OUTPATIENT)
Age: 77
End: 2022-04-18

## 2022-04-18 VITALS — OXYGEN SATURATION: 96 %

## 2022-04-18 LAB
ANION GAP SERPL CALC-SCNC: 11 MMOL/L — SIGNIFICANT CHANGE UP (ref 7–14)
BUN SERPL-MCNC: 26 MG/DL — HIGH (ref 7–23)
CALCIUM SERPL-MCNC: 8.7 MG/DL — SIGNIFICANT CHANGE UP (ref 8.4–10.5)
CHLORIDE SERPL-SCNC: 103 MMOL/L — SIGNIFICANT CHANGE UP (ref 98–107)
CO2 SERPL-SCNC: 24 MMOL/L — SIGNIFICANT CHANGE UP (ref 22–31)
CREAT SERPL-MCNC: 1.36 MG/DL — HIGH (ref 0.5–1.3)
EGFR: 54 ML/MIN/1.73M2 — LOW
GLUCOSE BLDC GLUCOMTR-MCNC: 126 MG/DL — HIGH (ref 70–99)
GLUCOSE BLDC GLUCOMTR-MCNC: 200 MG/DL — HIGH (ref 70–99)
GLUCOSE SERPL-MCNC: 126 MG/DL — HIGH (ref 70–99)
HCT VFR BLD CALC: 37.7 % — LOW (ref 39–50)
HGB BLD-MCNC: 12.4 G/DL — LOW (ref 13–17)
MAGNESIUM SERPL-MCNC: 2 MG/DL — SIGNIFICANT CHANGE UP (ref 1.6–2.6)
MCHC RBC-ENTMCNC: 32.9 GM/DL — SIGNIFICANT CHANGE UP (ref 32–36)
MCHC RBC-ENTMCNC: 33.2 PG — SIGNIFICANT CHANGE UP (ref 27–34)
MCV RBC AUTO: 101.1 FL — HIGH (ref 80–100)
NRBC # BLD: 0 /100 WBCS — SIGNIFICANT CHANGE UP
NRBC # FLD: 0 K/UL — SIGNIFICANT CHANGE UP
PHOSPHATE SERPL-MCNC: 3.1 MG/DL — SIGNIFICANT CHANGE UP (ref 2.5–4.5)
PLATELET # BLD AUTO: 262 K/UL — SIGNIFICANT CHANGE UP (ref 150–400)
POTASSIUM SERPL-MCNC: 3.7 MMOL/L — SIGNIFICANT CHANGE UP (ref 3.5–5.3)
POTASSIUM SERPL-SCNC: 3.7 MMOL/L — SIGNIFICANT CHANGE UP (ref 3.5–5.3)
RBC # BLD: 3.73 M/UL — LOW (ref 4.2–5.8)
RBC # FLD: 12.8 % — SIGNIFICANT CHANGE UP (ref 10.3–14.5)
SODIUM SERPL-SCNC: 138 MMOL/L — SIGNIFICANT CHANGE UP (ref 135–145)
WBC # BLD: 7.83 K/UL — SIGNIFICANT CHANGE UP (ref 3.8–10.5)
WBC # FLD AUTO: 7.83 K/UL — SIGNIFICANT CHANGE UP (ref 3.8–10.5)

## 2022-04-18 PROCEDURE — 76770 US EXAM ABDO BACK WALL COMP: CPT | Mod: 26

## 2022-04-18 RX ORDER — FUROSEMIDE 40 MG
1 TABLET ORAL
Qty: 30 | Refills: 0
Start: 2022-04-18 | End: 2022-05-17

## 2022-04-18 RX ORDER — OXYCODONE HYDROCHLORIDE 5 MG/1
1 TABLET ORAL
Qty: 12 | Refills: 0
Start: 2022-04-18 | End: 2022-04-20

## 2022-04-18 RX ORDER — LANOLIN ALCOHOL/MO/W.PET/CERES
3 CREAM (GRAM) TOPICAL ONCE
Refills: 0 | Status: COMPLETED | OUTPATIENT
Start: 2022-04-18 | End: 2022-04-18

## 2022-04-18 RX ADMIN — Medication 3 MILLIGRAM(S): at 04:02

## 2022-04-18 RX ADMIN — Medication 1: at 12:42

## 2022-04-18 RX ADMIN — OXYCODONE HYDROCHLORIDE 5 MILLIGRAM(S): 5 TABLET ORAL at 00:35

## 2022-04-18 RX ADMIN — Medication 40 MILLIGRAM(S): at 05:28

## 2022-04-18 RX ADMIN — OXYCODONE HYDROCHLORIDE 5 MILLIGRAM(S): 5 TABLET ORAL at 01:36

## 2022-04-18 RX ADMIN — PANTOPRAZOLE SODIUM 40 MILLIGRAM(S): 20 TABLET, DELAYED RELEASE ORAL at 05:28

## 2022-04-18 RX ADMIN — APIXABAN 5 MILLIGRAM(S): 2.5 TABLET, FILM COATED ORAL at 05:28

## 2022-04-18 RX ADMIN — Medication 50 MILLIGRAM(S): at 09:33

## 2022-04-18 RX ADMIN — BUDESONIDE AND FORMOTEROL FUMARATE DIHYDRATE 2 PUFF(S): 160; 4.5 AEROSOL RESPIRATORY (INHALATION) at 09:19

## 2022-04-18 RX ADMIN — APIXABAN 5 MILLIGRAM(S): 2.5 TABLET, FILM COATED ORAL at 18:05

## 2022-04-18 NOTE — DISCHARGE NOTE NURSING/CASE MANAGEMENT/SOCIAL WORK - NSDCPEFALRISK_GEN_ALL_CORE
For information on Fall & Injury Prevention, visit: [email or web address] OR  [email or web address] OR  https://www.cdc.gov/steadi/patient.html
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
## 2022-04-18 NOTE — PROGRESS NOTE ADULT - SUBJECTIVE AND OBJECTIVE BOX
Date of service: 04/18/22    chief complaint: PVC's    extended hpi: 77 y/o male with pmhx of HTN, DM2, COVID coagulopathy (hx of PE) on eliquis, Carotid artery disease, BPH, b/l LE venous insufficiency presents to [facility] as a transfer from [facility] for evaluation of frequent PVC's.     S: no chest pain or sob; ros otherwise negative.     Review of Systems:   Constitutional: [ ] fevers, [ ] chills.   Skin: [ ] dry skin. [ ] rashes.  Psychiatric: [ ] depression, [ ] anxiety.   Gastrointestinal: [ ] BRBPR, [ ] melena.   Neurological: [ ] confusion. [ ] seizures. [ ] shuffling gait.   Ears,Nose,Mouth and Throat: [ ] ear pain [ ] sore throat.   Eyes: [ ] diplopia.   Respiratory: [ ] hemoptysis. [ ] shortness of breath  Cardiovascular: See HPI above  Hematologic/Lymphatic: [ ] anemia. [ ] painful nodes. [ ] prolonged bleeding.   Genitourinary: [ ] hematuria. [ ] flank pain.   Endocrine: [ ] significant change in weight. [ ] intolerance to heat and cold.     Review of systems [x ] otherwise negative, [ ] otherwise unable to obtain    FH: no family history of sudden cardiac death in first degree relatives    SH: [ ] tobacco, [ ] alcohol, [ ] drugs      acetaminophen     Tablet .. 650 milliGRAM(s) Oral every 6 hours PRN  albuterol/ipratropium for Nebulization 3 milliLiter(s) Nebulizer every 6 hours PRN  apixaban 5 milliGRAM(s) Oral every 12 hours  budesonide 160 MICROgram(s)/formoterol 4.5 MICROgram(s) Inhaler 2 Puff(s) Inhalation two times a day  dextrose 5%. 1000 milliLiter(s) IV Continuous <Continuous>  dextrose 5%. 1000 milliLiter(s) IV Continuous <Continuous>  dextrose 50% Injectable 25 Gram(s) IV Push once  dextrose 50% Injectable 12.5 Gram(s) IV Push once  dextrose 50% Injectable 25 Gram(s) IV Push once  dextrose Oral Gel 15 Gram(s) Oral once PRN  furosemide    Tablet 40 milliGRAM(s) Oral daily  glucagon  Injectable 1 milliGRAM(s) IntraMuscular once  insulin lispro (ADMELOG) corrective regimen sliding scale   SubCutaneous three times a day before meals  insulin lispro (ADMELOG) corrective regimen sliding scale   SubCutaneous at bedtime  metoprolol succinate ER 50 milliGRAM(s) Oral daily  oxyCODONE    IR 5 milliGRAM(s) Oral every 6 hours PRN  pantoprazole    Tablet 40 milliGRAM(s) Oral before breakfast  polyethylene glycol 3350 17 Gram(s) Oral daily  senna 2 Tablet(s) Oral at bedtime  simvastatin 40 milliGRAM(s) Oral at bedtime  tamsulosin 0.4 milliGRAM(s) Oral at bedtime                            12.4   7.83  )-----------( 262      ( 18 Apr 2022 07:59 )             37.7       04-18    138  |  103  |  26<H>  ----------------------------<  126<H>  3.7   |  24  |  1.36<H>    Ca    8.7      18 Apr 2022 07:59  Phos  3.1     04-18  Mg     2.00     04-18              T(C): 36.7 (04-18-22 @ 09:30), Max: 36.9 (04-17-22 @ 21:35)  HR: 67 (04-18-22 @ 09:30) (55 - 67)  BP: 153/70 (04-18-22 @ 09:30) (128/68 - 153/70)  RR: 18 (04-18-22 @ 09:30) (18 - 18)  SpO2: 96% (04-18-22 @ 10:57) (91% - 97%)  Wt(kg): --    I&O's Summary    17 Apr 2022 07:01  -  18 Apr 2022 07:00  --------------------------------------------------------  IN: 320 mL / OUT: 800 mL / NET: -480 mL    18 Apr 2022 07:01  -  18 Apr 2022 13:05  --------------------------------------------------------  IN: 0 mL / OUT: 600 mL / NET: -600 mL          General: Well nourished in no acute distress. Alert and Oriented * 3.   Head: Normocephalic and atraumatic.   Neck: No JVD. No bruits. Supple. Does not appear to be enlarged.   Cardiovascular: + S1,S2 ; RRR Soft systolic murmur at the left lower sternal border. No rubs noted.    Lungs: CTA b/l. No rhonchi, rales or wheezes.   Abdomen: + BS, soft. Non tender. Non distended. No rebound. No guarding.   Extremities: No clubbing/cyanosis/edema.   Neurologic: Moves all four extremities. Full range of motion.   Skin: Warm and moist. The patient's skin has normal elasticity and good skin turgor.   Psychiatric: Appropriate mood and affect.  Musculoskeletal: Normal range of motion, normal strength    Tele: kings JUDD     A/P:  77 y/o male with pmhx of HTN, DM2, COVID coagulopathy (hx of PE) on eliquis, Carotid artery disease, BPH, b/l LE venous insufficiency presents to [facility] as a transfer from [facility] for evaluation of frequent PVC's.    -Pt. with no palpitations or syncope  -No clinical heart failure or anginal symptoms   -Recent NST demonstrated no ischemia or infarct  -TTE in Feb of 2022 with normal LVEF  -continue with beta blockers as tolerated   -management of history of PE per medicine team   -EP consult with Dr. Riggs/Harmony for further workup of PVC's appreciated - no plans for ablation at this time per EP     Harsh Sierra MD

## 2022-04-18 NOTE — PROGRESS NOTE ADULT - PROVIDER SPECIALTY LIST ADULT
Electrophysiology
Internal Medicine
Nephrology
Nephrology
Cardiology
Cardiology
Electrophysiology
Cardiology
Cardiology
Internal Medicine
Internal Medicine
Nephrology

## 2022-04-18 NOTE — DISCHARGE NOTE PROVIDER - CARE PROVIDERS DIRECT ADDRESSES
[email or web address],DirectAddress_Unknown

## 2022-04-18 NOTE — DISCHARGE NOTE NURSING/CASE MANAGEMENT/SOCIAL WORK - NSSCNAMETXT_GEN_ALL_CORE
[facility] [phone] Nurse to visit on the day following discharge. Other appropriate services to be arranged thereafter. Please contact the home care agency at the above phone number if you have not heard from them by approximately 12 noon on the day after your hospital discharge.

## 2022-04-18 NOTE — PROGRESS NOTE ADULT - REASON FOR ADMISSION
Frequent PVCs
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
## 2022-04-18 NOTE — PROGRESS NOTE ADULT - SUBJECTIVE AND OBJECTIVE BOX
[facility] [phone]        Patient seen and examined reports a history of an enlarged prostrate, feels as if he is not emptying fully today.         MEDICATIONS  (STANDING):  apixaban 5 milliGRAM(s) Oral every 12 hours  budesonide 160 MICROgram(s)/formoterol 4.5 MICROgram(s) Inhaler 2 Puff(s) Inhalation two times a day  dextrose 5%. 1000 milliLiter(s) (50 mL/Hr) IV Continuous <Continuous>  dextrose 5%. 1000 milliLiter(s) (100 mL/Hr) IV Continuous <Continuous>  dextrose 50% Injectable 25 Gram(s) IV Push once  dextrose 50% Injectable 12.5 Gram(s) IV Push once  dextrose 50% Injectable 25 Gram(s) IV Push once  furosemide    Tablet 40 milliGRAM(s) Oral daily  glucagon  Injectable 1 milliGRAM(s) IntraMuscular once  insulin lispro (ADMELOG) corrective regimen sliding scale   SubCutaneous three times a day before meals  insulin lispro (ADMELOG) corrective regimen sliding scale   SubCutaneous at bedtime  metoprolol succinate ER 50 milliGRAM(s) Oral daily  pantoprazole    Tablet 40 milliGRAM(s) Oral before breakfast  polyethylene glycol 3350 17 Gram(s) Oral daily  senna 2 Tablet(s) Oral at bedtime  simvastatin 40 milliGRAM(s) Oral at bedtime  tamsulosin 0.4 milliGRAM(s) Oral at bedtime      VITAL:  T(C): , Max: 36.9 (04-17-22 @ 21:35)  T(F): , Max: 98.4 (04-17-22 @ 21:35)  HR: 67 (04-18-22 @ 09:30)  BP: 153/70 (04-18-22 @ 09:30)  BP(mean): --  RR: 18 (04-18-22 @ 09:30)  SpO2: 96% (04-18-22 @ 10:57)  Wt(kg): --    I and O's:    04-17 @ 07:01  -  04-18 @ 07:00  --------------------------------------------------------  IN: 320 mL / OUT: 800 mL / NET: -480 mL    04-18 @ 07:01  -  04-18 @ 12:34  --------------------------------------------------------  IN: 0 mL / OUT: 600 mL / NET: -600 mL          PHYSICAL EXAM:    Constitutional: NAD  Neck:  No JVD  Respiratory: CTAB/L  Cardiovascular: S1 and S2  Gastrointestinal: BS+, soft, NT/ND  Extremities: No peripheral edema  Neurological: A/O x 3, no focal deficits  Psychiatric: Normal mood, normal affect  : No Olivas  Skin: No rashes  Access: Not applicable    LABS:                        12.4   7.83  )-----------( 262      ( 18 Apr 2022 07:59 )             37.7     04-18    138  |  103  |  26<H>  ----------------------------<  126<H>  3.7   |  24  |  1.36<H>    Ca    8.7      18 Apr 2022 07:59  Phos  3.1     04-18  Mg     2.00     04-18        < from: CT Chest w/wo IV Cont (04.15.22 @ 12:18) >  IMPRESSION: No mediastinal hemorrhage is noted.    Ill-defined nodules are present in both upper lobes. Follow-up CT scan is   recommended in 3 months to ensure resolution.    Small bilateral pleural effusions.    < end of copied text >               [facility] [phone]        Patient seen and examined reports a history of an enlarged prostrate, feels as if he is not emptying fully today.         MEDICATIONS  (STANDING):  apixaban 5 milliGRAM(s) Oral every 12 hours  budesonide 160 MICROgram(s)/formoterol 4.5 MICROgram(s) Inhaler 2 Puff(s) Inhalation two times a day  dextrose 5%. 1000 milliLiter(s) (50 mL/Hr) IV Continuous <Continuous>  dextrose 5%. 1000 milliLiter(s) (100 mL/Hr) IV Continuous <Continuous>  dextrose 50% Injectable 25 Gram(s) IV Push once  dextrose 50% Injectable 12.5 Gram(s) IV Push once  dextrose 50% Injectable 25 Gram(s) IV Push once  furosemide    Tablet 40 milliGRAM(s) Oral daily  glucagon  Injectable 1 milliGRAM(s) IntraMuscular once  insulin lispro (ADMELOG) corrective regimen sliding scale   SubCutaneous three times a day before meals  insulin lispro (ADMELOG) corrective regimen sliding scale   SubCutaneous at bedtime  metoprolol succinate ER 50 milliGRAM(s) Oral daily  pantoprazole    Tablet 40 milliGRAM(s) Oral before breakfast  polyethylene glycol 3350 17 Gram(s) Oral daily  senna 2 Tablet(s) Oral at bedtime  simvastatin 40 milliGRAM(s) Oral at bedtime  tamsulosin 0.4 milliGRAM(s) Oral at bedtime      VITAL:  T(C): , Max: 36.9 (04-17-22 @ 21:35)  T(F): , Max: 98.4 (04-17-22 @ 21:35)  HR: 67 (04-18-22 @ 09:30)  BP: 153/70 (04-18-22 @ 09:30)  BP(mean): --  RR: 18 (04-18-22 @ 09:30)  SpO2: 96% (04-18-22 @ 10:57)  Wt(kg): --    I and O's:    04-17 @ 07:01  -  04-18 @ 07:00  --------------------------------------------------------  IN: 320 mL / OUT: 800 mL / NET: -480 mL    04-18 @ 07:01  -  04-18 @ 12:34  --------------------------------------------------------  IN: 0 mL / OUT: 600 mL / NET: -600 mL          PHYSICAL EXAM:    Constitutional: NAD  Neck:  No JVD  Respiratory: CTAB/L  Cardiovascular: S1 and S2  Gastrointestinal: BS+, soft, NT/ND  Extremities: No peripheral edema  Neurological: A/O x 3, no focal deficits  Psychiatric: Normal mood, normal affect  : No Olivas  Skin: No rashes  Access: Not applicable    LABS:                        12.4   7.83  )-----------( 262      ( 18 Apr 2022 07:59 )             37.7     04-18    138  |  103  |  26<H>  ----------------------------<  126<H>  3.7   |  24  |  1.36<H>    Ca    8.7      18 Apr 2022 07:59  Phos  3.1     04-18  Mg     2.00     04-18        < from: CT Chest w/wo IV Cont (04.15.22 @ 12:18) >  IMPRESSION: No mediastinal hemorrhage is noted.    Ill-defined nodules are present in both upper lobes. Follow-up CT scan is   recommended in 3 months to ensure resolution.    Small bilateral pleural effusions.    Below the diaphragm, visualized portions of the abdomen demonstrate   low-attenuation lesion inthe kidneys bilaterally which are indeterminate   on this exam.    < end of copied text >                   [facility] [phone]        Patient seen and examined reports a history of an enlarged prostrate, feels as if he is not emptying fully today.         MEDICATIONS  (STANDING):  apixaban 5 milliGRAM(s) Oral every 12 hours  budesonide 160 MICROgram(s)/formoterol 4.5 MICROgram(s) Inhaler 2 Puff(s) Inhalation two times a day  dextrose 5%. 1000 milliLiter(s) (50 mL/Hr) IV Continuous <Continuous>  dextrose 5%. 1000 milliLiter(s) (100 mL/Hr) IV Continuous <Continuous>  dextrose 50% Injectable 25 Gram(s) IV Push once  dextrose 50% Injectable 12.5 Gram(s) IV Push once  dextrose 50% Injectable 25 Gram(s) IV Push once  furosemide    Tablet 40 milliGRAM(s) Oral daily  glucagon  Injectable 1 milliGRAM(s) IntraMuscular once  insulin lispro (ADMELOG) corrective regimen sliding scale   SubCutaneous three times a day before meals  insulin lispro (ADMELOG) corrective regimen sliding scale   SubCutaneous at bedtime  metoprolol succinate ER 50 milliGRAM(s) Oral daily  pantoprazole    Tablet 40 milliGRAM(s) Oral before breakfast  polyethylene glycol 3350 17 Gram(s) Oral daily  senna 2 Tablet(s) Oral at bedtime  simvastatin 40 milliGRAM(s) Oral at bedtime  tamsulosin 0.4 milliGRAM(s) Oral at bedtime      VITAL:  T(C): , Max: 36.9 (04-17-22 @ 21:35)  T(F): , Max: 98.4 (04-17-22 @ 21:35)  HR: 67 (04-18-22 @ 09:30)  BP: 153/70 (04-18-22 @ 09:30)  BP(mean): --  RR: 18 (04-18-22 @ 09:30)  SpO2: 96% (04-18-22 @ 10:57)  Wt(kg): --    I and O's:    04-17 @ 07:01  -  04-18 @ 07:00  --------------------------------------------------------  IN: 320 mL / OUT: 800 mL / NET: -480 mL    04-18 @ 07:01  -  04-18 @ 12:34  --------------------------------------------------------  IN: 0 mL / OUT: 600 mL / NET: -600 mL          PHYSICAL EXAM:    Constitutional: NAD  Neck:  No JVD  Respiratory: CTAB/L  Cardiovascular: S1 and S2  Gastrointestinal: BS+, soft, NT/ND  Extremities: No peripheral edema  Neurological: A/O x 3, no focal deficits  Psychiatric: Normal mood, normal affect  : No Olivas  Skin: No rashes  Access: Not applicable    LABS:                        12.4   7.83  )-----------( 262      ( 18 Apr 2022 07:59 )             37.7     04-18    138  |  103  |  26<H>  ----------------------------<  126<H>  3.7   |  24  |  1.36<H>    Ca    8.7      18 Apr 2022 07:59  Phos  3.1     04-18  Mg     2.00     04-18    Urine Creat 164, Protein 68    < from: CT Chest w/wo IV Cont (04.15.22 @ 12:18) >  IMPRESSION: No mediastinal hemorrhage is noted.    Ill-defined nodules are present in both upper lobes. Follow-up CT scan is   recommended in 3 months to ensure resolution.    Small bilateral pleural effusions.    Below the diaphragm, visualized portions of the abdomen demonstrate   low-attenuation lesion inthe kidneys bilaterally which are indeterminate   on this exam.    < end of copied text >
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
## 2022-04-18 NOTE — DISCHARGE NOTE PROVIDER - NSFOLLOWUPCLINICS_GEN_ALL_ED_FT
[facility] [place]  [facility]  .  [place]   Phone: [phone]  Fax:   Follow Up Time: 1 week

## 2022-04-18 NOTE — DISCHARGE NOTE PROVIDER - NSDCFUADDAPPT_GEN_ALL_CORE_FT
Please follow up with your PCP, cardiology, orthopedics, and nephrology (Dr. Wooten). Please follow up with your PCP, cardiology, orthopedics, and nephrology (Dr. Wooten).  You will need to see Dr. Wooten within 1 week.
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
## 2022-04-18 NOTE — DISCHARGE NOTE PROVIDER - NSDCMRMEDTOKEN_GEN_ALL_CORE_FT
cilostazol 50 mg oral tablet: 1 tab(s) orally 2 times a day  Januvia 50 mg oral tablet: 1 tab(s) orally once a day  lisinopril 20 mg oral tablet: 1 tab(s) orally once a day  loratadine 10 mg oral tablet: 1 tab(s) orally once a day  metFORMIN 1000 mg oral tablet: 1 tab(s) orally 2 times a day  metoprolol succinate 50 mg oral tablet, extended release: 1 tab(s) orally once a day  multivitamin: 1 tab(s) orally once a day  oxyCODONE 5 mg oral capsule: 1 cap(s) orally every 6 hours MDD:4  pantoprazole 40 mg oral delayed release tablet: 1 tab(s) orally once a day  Percocet 5/325 325 mg-5 mg oral tablet: 1 tab(s) orally every 6 hours MDD:4  simvastatin 40 mg oral tablet: 1 tab(s) orally once a day (at bedtime)  Symbicort 160 mcg-4.5 mcg/inh inhalation aerosol: 2 puff(s) inhaled 2 times a day  tamsulosin 0.4 mg oral capsule: 1 cap(s) orally once a day   cilostazol 50 mg oral tablet: 1 tab(s) orally 2 times a day  Januvia 50 mg oral tablet: 1 tab(s) orally once a day  lisinopril 20 mg oral tablet: 1 tab(s) orally once a day  loratadine 10 mg oral tablet: 1 tab(s) orally once a day  metFORMIN 1000 mg oral tablet: 1 tab(s) orally 2 times a day  metoprolol succinate 50 mg oral tablet, extended release: 1 tab(s) orally once a day  multivitamin: 1 tab(s) orally once a day  oxyCODONE 5 mg oral capsule: 1 cap(s) orally every 6 hours, As Needed MDD:4   pantoprazole 40 mg oral delayed release tablet: 1 tab(s) orally once a day  Percocet 5/325 325 mg-5 mg oral tablet: 1 tab(s) orally every 6 hours MDD:4  simvastatin 40 mg oral tablet: 1 tab(s) orally once a day (at bedtime)  Symbicort 160 mcg-4.5 mcg/inh inhalation aerosol: 2 puff(s) inhaled 2 times a day  tamsulosin 0.4 mg oral capsule: 1 cap(s) orally once a day   cilostazol 50 mg oral tablet: 1 tab(s) orally 2 times a day  Januvia 50 mg oral tablet: 1 tab(s) orally once a day  lisinopril 20 mg oral tablet: 1 tab(s) orally once a day  loratadine 10 mg oral tablet: 1 tab(s) orally once a day  metFORMIN 1000 mg oral tablet: 1 tab(s) orally 2 times a day  metoprolol succinate 50 mg oral tablet, extended release: 1 tab(s) orally once a day  multivitamin: 1 tab(s) orally once a day  oxyCODONE 5 mg oral capsule: 1 cap(s) orally every 6 hours, As Needed MDD:4   pantoprazole 40 mg oral delayed release tablet: 1 tab(s) orally once a day  simvastatin 40 mg oral tablet: 1 tab(s) orally once a day (at bedtime)  Symbicort 160 mcg-4.5 mcg/inh inhalation aerosol: 2 puff(s) inhaled 2 times a day  tamsulosin 0.4 mg oral capsule: 1 cap(s) orally once a day   cilostazol 50 mg oral tablet: 1 tab(s) orally 2 times a day  furosemide 20 mg oral tablet: 1 tab(s) orally once a day   Januvia 50 mg oral tablet: 1 tab(s) orally once a day  lisinopril 20 mg oral tablet: 1 tab(s) orally once a day  loratadine 10 mg oral tablet: 1 tab(s) orally once a day  metFORMIN 1000 mg oral tablet: 1 tab(s) orally 2 times a day  metoprolol succinate 50 mg oral tablet, extended release: 1 tab(s) orally once a day  multivitamin: 1 tab(s) orally once a day  oxyCODONE 5 mg oral capsule: 1 cap(s) orally every 6 hours, As Needed MDD:4   pantoprazole 40 mg oral delayed release tablet: 1 tab(s) orally once a day  simvastatin 40 mg oral tablet: 1 tab(s) orally once a day (at bedtime)  Symbicort 160 mcg-4.5 mcg/inh inhalation aerosol: 2 puff(s) inhaled 2 times a day  tamsulosin 0.4 mg oral capsule: 1 cap(s) orally once a day

## 2022-04-18 NOTE — DISCHARGE NOTE PROVIDER - PROVIDER TOKENS
PROVIDER:[TOKEN:[[id number]],FOLLOWUP:[1 week]],FREE:[LAST:[Your Primary Care Physician],PHONE:[(   )    -],FAX:[(   )    -],FOLLOWUP:[1 week]],PROVIDER:[TOKEN:[[id number]],FOLLOWUP:[1 week]]

## 2022-04-18 NOTE — DISCHARGE NOTE PROVIDER - CARE PROVIDER_API CALL
Aline Wooten (MD)  Internal Medicine; Nephrology  [address], [address]  [place] [address]  Phone: [phone]  Fax: [phone]  Follow Up Time: 1 week    Your Primary Care Physician,   Phone: (   )    -  Fax: (   )    -  Follow Up Time: 1 week    Juan Riggs)  Cardiovascular Disease; Internal Medicine  [address] [address]  [place] [address]  Phone: [phone]  Fax: [phone]  Follow Up Time: 1 week

## 2022-04-18 NOTE — PROGRESS NOTE ADULT - ASSESSMENT
ASSESSMENT/PLAN:  Pt is a 77 y/o male with pmhx of HTN, DM2, COVID coagulopathy (hx of PE) on eliquis, Carotid artery disease, BPH, b/l LE venous insufficiency presents to [facility] as a transfer from [facility] for evaluation of frequent bigminy PVCs  CKD stage 3 a- renal function stable   1 Renal - renal function stable continue Lasix 40mg po qd  2 CVS-EF normal.  BP acceptable. EP consulted no plans for ablation remains on Metoprolol Succinate 50 mg daily   3 Pulm-CT chest dose not appear to be in failure     RECOMMENDATIONS  -Continue Lasix 40 gm PO QD  -Avoid nephrotoxins as able    Joanie Roberts NP   Health system  [phone]  ASSESSMENT/PLAN:  Pt is a 75 y/o male with pmhx of HTN, DM2, COVID coagulopathy (hx of PE) on eliquis, Carotid artery disease, BPH, b/l LE venous insufficiency presents to [facility] as a transfer from [facility] for evaluation of frequent bigminy PVCs  CKD stage 3 a- renal function stable   1 Renal - renal function stable continue Lasix 40mg po qd  2 CVS-EF normal.  BP acceptable. EP consulted no plans for ablation remains on Metoprolol Succinate 50 mg daily   3 Pulm-CT chest dose not appear to be in failure     RECOMMENDATIONS  -Continue Lasix 40 gm PO QD  -Check Renal US  -Check PVR   -Avoid nephrotoxins as able    Joanie Roberts NP   [facility]  [phone]  ASSESSMENT/PLAN:  Pt is a 77 y/o male with pmhx of HTN, DM2, COVID coagulopathy (hx of PE) on eliquis, Carotid artery disease, BPH, b/l LE venous insufficiency presents to [facility] as a transfer from [facility] for evaluation of frequent bigminy PVCs  CKD stage 3 a- renal function stable   1 Renal - renal function stable continue Lasix 40mg po qd  2 CVS-EF normal.  BP acceptable. EP consulted no plans for ablation remains on Metoprolol Succinate 50 mg daily   3 Pulm-CT chest dose not appear to be in failure     RECOMMENDATIONS  -Continue Lasix 40 gm PO QD  -Check Renal US; can be done as outpatient  -Check PVR   -Avoid nephrotoxins as able  - No renal objection to discharge, patient should follow up with Dr. Wooten in the office     Joanie Roberts NP   [facility]  [phone]  ASSESSMENT/PLAN:  Pt is a 75 y/o male with pmhx of HTN, DM2, COVID coagulopathy (hx of PE) on eliquis, Carotid artery disease, BPH, b/l LE venous insufficiency presents to [facility] as a transfer from [facility] for evaluation of frequent bigminy PVCs  CKD stage 3 a- renal function stable, proteinuria ~ 0.4g/day   1 Renal - renal function stable continue Lasix 40mg po qd  2 CVS-EF normal.  BP acceptable. EP consulted no plans for ablation remains on Metoprolol Succinate 50 mg daily   3 Pulm-CT chest dose not appear to be in failure     RECOMMENDATIONS  -Continue Lasix 40 gm PO QD  -Check Renal US; can be done as outpatient  -Check PVR   -Avoid nephrotoxins as able  - No renal objection to discharge, patient should follow up with Dr. Wooten in the office     Joanie Roberts NP   [facility]  [phone]

## 2022-04-18 NOTE — DISCHARGE NOTE NURSING/CASE MANAGEMENT/SOCIAL WORK - PATIENT PORTAL LINK FT
You can access the FollowMyHealth Patient Portal offered by [facility] by registering at the following website: [email or web address]. By joining [employer]’s FollowMyHealth portal, you will also be able to view your health information using other applications (apps) compatible with our system.

## 2022-05-09 ENCOUNTER — RX RENEWAL (OUTPATIENT)
Age: 77
End: 2022-05-09

## 2022-05-17 ENCOUNTER — APPOINTMENT (OUTPATIENT)
Dept: ORTHOPEDIC SURGERY | Facility: CLINIC | Age: 77
End: 2022-05-17
Payer: MEDICARE

## 2022-05-17 DIAGNOSIS — S13.9XXA SPRAIN OF JOINTS AND LIGAMENTS OF UNSPECIFIED PARTS OF NECK, INITIAL ENCOUNTER: ICD-10-CM

## 2022-05-17 DIAGNOSIS — S20.212A CONTUSION OF LEFT FRONT WALL OF THORAX, INITIAL ENCOUNTER: ICD-10-CM

## 2022-05-17 DIAGNOSIS — S46.012A STRAIN OF MUSCLE(S) AND TENDON(S) OF THE ROTATOR CUFF OF LEFT SHOULDER, INITIAL ENCOUNTER: ICD-10-CM

## 2022-05-17 DIAGNOSIS — S40.012A CONTUSION OF LEFT SHOULDER, INITIAL ENCOUNTER: ICD-10-CM

## 2022-05-17 PROCEDURE — 71100 X-RAY EXAM RIBS UNI 2 VIEWS: CPT | Mod: LT

## 2022-06-08 ENCOUNTER — RX RENEWAL (OUTPATIENT)
Age: 77
End: 2022-06-08
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
## 2022-07-27 ENCOUNTER — APPOINTMENT (OUTPATIENT)
Dept: PULMONOLOGY | Facility: CLINIC | Age: 77
End: 2022-07-27

## 2022-07-27 VITALS
DIASTOLIC BLOOD PRESSURE: 73 MMHG | WEIGHT: 178 LBS | TEMPERATURE: 98 F | HEIGHT: 68 IN | BODY MASS INDEX: 26.98 KG/M2 | OXYGEN SATURATION: 97 % | RESPIRATION RATE: 16 BRPM | HEART RATE: 69 BPM | SYSTOLIC BLOOD PRESSURE: 152 MMHG

## 2022-07-27 VITALS — DIASTOLIC BLOOD PRESSURE: 69 MMHG | SYSTOLIC BLOOD PRESSURE: 144 MMHG

## 2022-07-27 PROCEDURE — 99214 OFFICE O/P EST MOD 30 MIN: CPT

## 2022-07-27 RX ORDER — BLOOD SUGAR DIAGNOSTIC
STRIP MISCELLANEOUS
Qty: 100 | Refills: 0 | Status: ACTIVE | COMMUNITY
Start: 2021-11-22

## 2022-07-27 RX ORDER — METOPROLOL SUCCINATE 50 MG/1
50 TABLET, EXTENDED RELEASE ORAL
Qty: 90 | Refills: 0 | Status: ACTIVE | COMMUNITY
Start: 2022-04-05

## 2022-07-27 RX ORDER — SITAGLIPTIN 50 MG/1
50 TABLET, FILM COATED ORAL
Qty: 90 | Refills: 0 | Status: ACTIVE | COMMUNITY
Start: 2022-04-04

## 2022-07-27 RX ORDER — CILOSTAZOL 50 MG/1
50 TABLET ORAL
Qty: 180 | Refills: 0 | Status: ACTIVE | COMMUNITY
Start: 2022-04-04

## 2022-07-27 NOTE — REASON FOR VISIT
[Follow-Up] : a follow-up visit [Pulmonary Embolism] : pulmonary embolism [Pulmonary Fibrosis] : pulmonary fibrosis [Pulmonary Nodules] : pulmonary nodules [TextBox_13] : Dr. STU CRESPO

## 2022-10-06 ENCOUNTER — APPOINTMENT (OUTPATIENT)
Dept: CT IMAGING | Facility: CLINIC | Age: 77
End: 2022-10-06

## 2022-10-06 ENCOUNTER — RESULT REVIEW (OUTPATIENT)
Age: 77
End: 2022-10-06

## 2022-10-06 ENCOUNTER — OUTPATIENT (OUTPATIENT)
Dept: OUTPATIENT SERVICES | Facility: HOSPITAL | Age: 77
LOS: 1 days | End: 2022-10-06
Payer: MEDICARE

## 2022-10-06 DIAGNOSIS — Z90.79 ACQUIRED ABSENCE OF OTHER GENITAL ORGAN(S): Chronic | ICD-10-CM

## 2022-10-06 DIAGNOSIS — Z98.89 OTHER SPECIFIED POSTPROCEDURAL STATES: Chronic | ICD-10-CM

## 2022-10-06 DIAGNOSIS — Z98.41 CATARACT EXTRACTION STATUS, RIGHT EYE: Chronic | ICD-10-CM

## 2022-10-06 DIAGNOSIS — K40.90 UNILATERAL INGUINAL HERNIA, WITHOUT OBSTRUCTION OR GANGRENE, NOT SPECIFIED AS RECURRENT: Chronic | ICD-10-CM

## 2022-10-06 DIAGNOSIS — I26.99 OTHER PULMONARY EMBOLISM WITHOUT ACUTE COR PULMONALE: ICD-10-CM

## 2022-10-06 PROCEDURE — 71250 CT THORAX DX C-: CPT | Mod: 26

## 2022-10-06 PROCEDURE — 71250 CT THORAX DX C-: CPT

## 2022-10-19 ENCOUNTER — APPOINTMENT (OUTPATIENT)
Dept: PULMONOLOGY | Facility: CLINIC | Age: 77
End: 2022-10-19

## 2022-10-19 VITALS
WEIGHT: 178 LBS | SYSTOLIC BLOOD PRESSURE: 151 MMHG | OXYGEN SATURATION: 96 % | HEIGHT: 68 IN | DIASTOLIC BLOOD PRESSURE: 73 MMHG | TEMPERATURE: 98 F | BODY MASS INDEX: 26.98 KG/M2 | HEART RATE: 93 BPM

## 2022-10-19 DIAGNOSIS — Z87.891 PERSONAL HISTORY OF NICOTINE DEPENDENCE: ICD-10-CM

## 2022-10-19 DIAGNOSIS — J84.10 PULMONARY FIBROSIS, UNSPECIFIED: ICD-10-CM

## 2022-10-19 PROCEDURE — 99214 OFFICE O/P EST MOD 30 MIN: CPT

## 2022-10-19 PROCEDURE — 99072 ADDL SUPL MATRL&STAF TM PHE: CPT

## 2022-10-19 NOTE — REASON FOR VISIT
[Follow-Up] : a follow-up visit [COPD] : COPD [Pulmonary Embolism] : pulmonary embolism [Pulmonary Fibrosis] : pulmonary fibrosis [Spouse] : spouse [TextBox_13] : Dr. STU CRESPO

## 2022-10-19 NOTE — ASSESSMENT
[FreeTextEntry1] : \par ___________\par PATIENT SUMMARY\par ______________\par KELLY HAN 1945 \par CONTACT.  [phone]  \par PCP. DR STU CRESPO\par REFERRING MD. DR STU CRESPO\par INITIAL VISIT DATE . 7/1/2020\par ALLERGY. 6/27/2020 pncl\par HISTORY. \par Pt sees me for copd \par He has ho PE in setting of COVID has a fib and arlette eliquis  \par He also has abn ct chest \par _______________________\par PULMONARY EMBOLISM  4/2020  \par ______________________-\par HISTORY.\par Had pe in setting of COVID 4/2020\par V DUPLX \par 7/13/2020 V duplex legs  026 0423 Neg-jose raul \par BNP \par 7/3/2020 bnp 345 \par D-dimr \par 7/3/2020 d-dimr 154 \par MEDS \par Is on eliquis for a fib\par PLANNED DURATION OF RX WITH ELIQUIS\par 9/16/2020 Was asked by his cardio to continue apixaban (presumably for is a fib)\par 10/19/2022 Pt vte was provoked by covid so from Pulm standpoint he may stop anticoagn (recommended for just 3 m) \par ASSESSMENT \par VTE recurrence is unlikely being that he is on apixaban\par RECOMMENDATIONS.\par 10/19/2022 Pt advised to check with us as well as with pharmacicst whenevcer new med is added to ss if it interacts with apixaban \par ______________\par FIBROTIC SCAR R BASE ON CXR DONE 7/13/2020 (NEW FROM 2018)\par _______________\par \par SMOKING HISTRY\par Used to pipe smoke \par Quit 4/2020\par CT\par CT ch 10/6/2022 cw 4/15/2022 \par Residual linear rll atelectasis/scarring persists  rll 0.4 cm nodular opacity and l lo lobe .3 cm nodule nsc cw 10/9/2020  \par IMP \par Interval clearing of multifocal gg nodules cw 4/15/2022 \par Couple of addnal bl lower lobe micronodules nsc cw prior for ex 10/9/200 resolution of bl effsns \par ct chest 4/19/2021 cw 10/9/2020 \par rll linear opac unchanged 1 y ct recommended \par 3 mm lll pulm nodule nsc \par CXR \par 7/13/2020 CXR  403 6199  \par Hyperinflation suggestive of copd \par Disc atelect v fibrotic scarring r base which is new cw 2018 \par nsc subcm nodule l base\par ASSESSMENT \par Former smoker \par HO COVID\par Sacrring and nodule in lung on 4/2021 ct ch\par 10/6/2022 ct showed improvement\par RECOMMENDATION\par Will consider repeat CT ch 7/2023\par ________________________________\par NEED FOR HOME OXYGEN\par __________________________ .\par \par PULSE OXIMETRY .\par 10/19/2022 ra rest 96%\par 7/27/2022 ra rest 97%\par 1/19/2022 ra rest po 96%\par \par ASSESSMENT .\par Does not need home O2\par _____________________________\par IMMUNIZATION.\par ___________________ \par ASSESSMENT\par RECOMMENDATION.\par 10/19/2022 Advised flu vaccine \par \par _______________\par SMOKING EVALUATION.\par _________\par \par PATIENT DATA.\par QUIT SMOKING 2018 PIPE SMOKER \par \par ASSESSMENT RECOMMENDATIONS.\par Nonsmoker\par _________________________\par LUNG CANCER SCREENING  \par ________________\par ASSESSMENT RECOMMENDATIONS.\par DOES NOT MEET ELIGIBILITY CRITERIA AS HE WAS LIGHT SMOKER\par \par ______\par OBESITY .\par __________\par \par PATIENT DATA. \par WEIGHT.\par 10/19/2022 178\par 7/27/2022 178\par 1/19/2022 wt 175 lb\par BMI.\par 10/19/2022 27\par 7/27/2022 27\par 1/19/2022 bmi 26\par \par ASSESSMENT . \par Not obese\par \par \par \par \par

## 2023-01-09 ENCOUNTER — APPOINTMENT (OUTPATIENT)
Dept: PULMONOLOGY | Facility: CLINIC | Age: 78
End: 2023-01-09

## 2023-01-11 ENCOUNTER — APPOINTMENT (OUTPATIENT)
Dept: PULMONOLOGY | Facility: CLINIC | Age: 78
End: 2023-01-11

## 2023-02-04 ENCOUNTER — RX RENEWAL (OUTPATIENT)
Age: 78
End: 2023-02-04

## 2023-07-10 ENCOUNTER — OUTPATIENT (OUTPATIENT)
Dept: OUTPATIENT SERVICES | Facility: HOSPITAL | Age: 78
LOS: 1 days | End: 2023-07-10
Payer: MEDICARE

## 2023-07-10 ENCOUNTER — APPOINTMENT (OUTPATIENT)
Dept: CT IMAGING | Facility: CLINIC | Age: 78
End: 2023-07-10
Payer: MEDICARE

## 2023-07-10 DIAGNOSIS — K40.90 UNILATERAL INGUINAL HERNIA, WITHOUT OBSTRUCTION OR GANGRENE, NOT SPECIFIED AS RECURRENT: Chronic | ICD-10-CM

## 2023-07-10 DIAGNOSIS — Z90.79 ACQUIRED ABSENCE OF OTHER GENITAL ORGAN(S): Chronic | ICD-10-CM

## 2023-07-10 DIAGNOSIS — R91.1 SOLITARY PULMONARY NODULE: ICD-10-CM

## 2023-07-10 DIAGNOSIS — Z98.41 CATARACT EXTRACTION STATUS, RIGHT EYE: Chronic | ICD-10-CM

## 2023-07-10 DIAGNOSIS — Z98.89 OTHER SPECIFIED POSTPROCEDURAL STATES: Chronic | ICD-10-CM

## 2023-07-10 PROCEDURE — 71250 CT THORAX DX C-: CPT

## 2023-07-10 PROCEDURE — 71250 CT THORAX DX C-: CPT | Mod: 26

## 2023-07-12 ENCOUNTER — APPOINTMENT (OUTPATIENT)
Dept: PULMONOLOGY | Facility: CLINIC | Age: 78
End: 2023-07-12
Payer: MEDICARE

## 2023-07-12 VITALS
SYSTOLIC BLOOD PRESSURE: 162 MMHG | HEART RATE: 92 BPM | DIASTOLIC BLOOD PRESSURE: 77 MMHG | HEIGHT: 68 IN | RESPIRATION RATE: 15 BRPM | OXYGEN SATURATION: 96 % | BODY MASS INDEX: 24.78 KG/M2 | TEMPERATURE: 98 F | WEIGHT: 163.5 LBS

## 2023-07-12 DIAGNOSIS — I26.99 OTHER PULMONARY EMBOLISM W/OUT ACUTE COR PULMONALE: ICD-10-CM

## 2023-07-12 PROCEDURE — 94010 BREATHING CAPACITY TEST: CPT

## 2023-07-12 PROCEDURE — 94729 DIFFUSING CAPACITY: CPT

## 2023-07-12 PROCEDURE — 99214 OFFICE O/P EST MOD 30 MIN: CPT | Mod: 25

## 2023-07-12 PROCEDURE — 94727 GAS DIL/WSHOT DETER LNG VOL: CPT

## 2023-07-12 RX ORDER — BUDESONIDE AND FORMOTEROL FUMARATE DIHYDRATE 160; 4.5 UG/1; UG/1
160-4.5 AEROSOL RESPIRATORY (INHALATION)
Qty: 10.2 | Refills: 4 | Status: ACTIVE | COMMUNITY
Start: 2021-02-16 | End: 1900-01-01

## 2023-07-12 RX ORDER — BUDESONIDE AND FORMOTEROL FUMARATE DIHYDRATE 160; 4.5 UG/1; UG/1
160-4.5 AEROSOL RESPIRATORY (INHALATION)
Qty: 30.6 | Refills: 2 | Status: ACTIVE | COMMUNITY
Start: 2022-09-21 | End: 1900-01-01

## 2023-07-12 NOTE — ASSESSMENT
[FreeTextEntry1] : \par ___________\par PATIENT SUMMARY\par ______________\par KELLY HAN 1945 \par CONTACT.  [phone]  \par PCP. DR STU CRESPO\par REFERRING MD. DR STU CRESPO\par INITIAL VISIT DATE . 7/1/2020\par ALLERGY. 6/27/2020 pncl\par SMOKING HISTRY\par Used to pipe smoke \par Quit 4/2020\par HISTORY. \par Pt sees me for copd \par He has ho PE in setting of COVID has a fib and arlette eliquis  \par He also has abn ct chest \par \par ___________________\par PROBLEMS\par ________________\par . PULMONARY EMBOLISM  4/2020  \par . FIBROTIC SCAR R BASE ON CXR DONE 7/13/2020 (NEW FROM 2018)\par . LUNG NODULE \par . COPD/ASTHMA.\par . A fib\par . CLL\par .. 7/12/2023  diagnosed 2023 Not started on any rx\par                                   \par \par _______________________\par PULMONARY EMBOLISM  4/2020  \par ______________________-\par HISTORY.\par . Had pe in setting of COVID 4/2020\par V DUPLX \par . 7/13/2020 V duplex legs  076 2981 Neg-jose raul \par BNP \par . 7/3/2020 bnp 345 \par D-dimr \par . 7/3/2020 d-dimr 154 \par MEDS \par . Is on eliquis for a fib\par PLANNED DURATION OF RX WITH ELIQUIS\par . 9/16/2020 Was asked by his cardio to continue apixaban (presumably) for  a fib\par . 10/19/2022 Pt vte was provoked by covid so from Pulm standpoint he may stop anticoagn (recommended for just 3 m) \par ASSESSMENT \par . VTE recurrence is unlikely being that he is on apixaban\par RECOMMENDATIONS.\par . Pt advised to check with us as well as with pharmacicst whenevcer new med is added to ss if it interacts with apixaban \par ______________\par FIBROTIC SCAR R BASE ON CXR DONE 7/13/2020 (NEW FROM 2018)\par _______________\par \par SMOKING HISTRY\par . Used to pipe smoke Quit 4/2020\par CT\par . CT ch 10/6/2022 cw 4/15/2022 \par .. Residual linear rll atelectasis/scarring persists  rll 0.4 cm nodular opacity and l lo lobe .3 cm nodule nsc cw 10/9/2020  \par .. IMP \par ..  Interval clearing of multifocal gg nodules cw 4/15/2022 \par ..  Couple of addnal bl lower lobe micronodules nsc cw prior for ex 10/9/200 resolution of bl effsns \par . ct chest 4/19/2021 cw 10/9/2020 \par .. rll linear opac unchanged 1 y ct recommended \par .. 3 mm lll pulm nodule nsc \par CXR \par . 7/13/2020 CXR  546 9789  \par .. Hyperinflation suggestive of copd \par .. Disc atelect v fibrotic scarring r base which is new cw 2018 \par .. nsc subcm nodule l base\par ASSESSMENT \par . Former smoker \par . HO COVID\par . Sacrring and nodule in lung on 4/2021 ct ch\par . 10/6/2022 ct showed improvement\par RECOMMENDATION\par . 7/12/2023 Pt states he did ct chest a week ago but report is pending  will call him once i get ct\par ___________\par COPD/ASTHMA \par ____________\par \par PFTS.\par . PFTS 10/16/2020 FEV1/FVC 69% FEF 2575 68% DLCO 94%\par Mild obstruction Presevred diffiusion suggests asthma \par \par \par MEDS.\par .. 7/12/2023 Ordeerd budesonide formoterol 160 2p bod \par ______________\par ASSESSMENT .\par \par COPD ASTHMA OVERLAP \par COPD\par . Former pipe smoker \par ASTHMA \par . 2/24/2021 Feels better while on symbicort\par . 12/16/2020 Feels bettwe with symbicort\par . AEC 7/3/2020  \par . PFTS 10/16/2020 FEV1/FVC 69% FEF 2575 68% DLCO 94%\par ______________\par RECOMMENDATIONS .\par . doing well \par . 7/12/2023 refer PFTS \par \par TIME SPENT\par .. A total of 32 minutes were spent during this patient visit with various face to face as well as non face to face activities such as data mining medical decision making placing orders explaining plan risks benefits alternatives etc \par

## 2023-07-12 NOTE — REASON FOR VISIT
[Follow-Up] : a follow-up visit [Asthma] : asthma [COPD] : COPD [Pulmonary Nodules] : pulmonary nodules [TextBox_13] : Dr. STU CRESPO

## 2023-07-20 ENCOUNTER — FORM ENCOUNTER (OUTPATIENT)
Age: 78
End: 2023-07-20

## 2023-10-11 ENCOUNTER — APPOINTMENT (OUTPATIENT)
Dept: PULMONOLOGY | Facility: CLINIC | Age: 78
End: 2023-10-11
Payer: MEDICARE

## 2023-10-11 VITALS
HEART RATE: 49 BPM | SYSTOLIC BLOOD PRESSURE: 136 MMHG | BODY MASS INDEX: 24.71 KG/M2 | OXYGEN SATURATION: 99 % | WEIGHT: 163 LBS | DIASTOLIC BLOOD PRESSURE: 66 MMHG | HEIGHT: 68 IN

## 2023-10-11 DIAGNOSIS — J45.20 MILD INTERMITTENT ASTHMA, UNCOMPLICATED: ICD-10-CM

## 2023-10-11 DIAGNOSIS — R91.1 SOLITARY PULMONARY NODULE: ICD-10-CM

## 2023-10-11 DIAGNOSIS — J44.9 CHRONIC OBSTRUCTIVE PULMONARY DISEASE, UNSPECIFIED: ICD-10-CM

## 2023-10-11 PROCEDURE — 99214 OFFICE O/P EST MOD 30 MIN: CPT

## 2023-10-11 RX ORDER — BUDESONIDE AND FORMOTEROL FUMARATE DIHYDRATE 160; 4.5 UG/1; UG/1
160-4.5 AEROSOL RESPIRATORY (INHALATION) TWICE DAILY
Qty: 2 | Refills: 4 | Status: ACTIVE | COMMUNITY
Start: 2023-10-11 | End: 1900-01-01

## 2023-11-22 RX ORDER — BUDESONIDE AND FORMOTEROL FUMARATE DIHYDRATE 160; 4.5 UG/1; UG/1
160-4.5 AEROSOL RESPIRATORY (INHALATION)
Qty: 10.2 | Refills: 4 | Status: ACTIVE | COMMUNITY
Start: 2023-11-22 | End: 1900-01-01

## 2024-02-07 ENCOUNTER — OFFICE (OUTPATIENT)
Dept: URBAN - METROPOLITAN AREA CLINIC 34 | Facility: CLINIC | Age: 79
Setting detail: OPHTHALMOLOGY
End: 2024-02-07
Payer: MEDICARE

## 2024-02-07 DIAGNOSIS — H25.12: ICD-10-CM

## 2024-02-07 DIAGNOSIS — H40.013: ICD-10-CM

## 2024-02-07 PROCEDURE — 92083 EXTENDED VISUAL FIELD XM: CPT | Performed by: OPHTHALMOLOGY

## 2024-02-07 PROCEDURE — 99213 OFFICE O/P EST LOW 20 MIN: CPT | Performed by: OPHTHALMOLOGY

## 2024-02-07 PROCEDURE — 92133 CPTRZD OPH DX IMG PST SGM ON: CPT | Performed by: OPHTHALMOLOGY

## 2024-02-07 PROCEDURE — 92136 OPHTHALMIC BIOMETRY: CPT | Mod: LT | Performed by: OPHTHALMOLOGY

## 2024-02-07 ASSESSMENT — REFRACTION_CURRENTRX
OD_AXIS: 006
OD_SPHERE: -1.00
OD_CYLINDER: +0.50
OD_OVR_VA: 20/
OD_ADD: +2.00
OS_SPHERE: -2.00
OD_VPRISM_DIRECTION: PROGS
OS_OVR_VA: 20/
OS_AXIS: 101
OS_VPRISM_DIRECTION: PROGS
OS_CYLINDER: +1.25
OS_ADD: +2.50

## 2024-02-07 ASSESSMENT — REFRACTION_AUTOREFRACTION
OS_CYLINDER: +1.00
OD_SPHERE: -1.75
OD_CYLINDER: +1.25
OS_SPHERE: -2.25
OD_AXIS: 006
OS_AXIS: 097

## 2024-02-07 ASSESSMENT — SPHEQUIV_DERIVED
OD_SPHEQUIV: -1.125
OS_SPHEQUIV: -1.75

## 2024-02-07 ASSESSMENT — CONFRONTATIONAL VISUAL FIELD TEST (CVF)
OS_FINDINGS: FULL
OD_FINDINGS: FULL

## 2024-02-13 ENCOUNTER — APPOINTMENT (OUTPATIENT)
Dept: PULMONOLOGY | Facility: CLINIC | Age: 79
End: 2024-02-13

## 2024-03-14 ENCOUNTER — AMBULATORY SURGERY CENTER (OUTPATIENT)
Dept: URBAN - METROPOLITAN AREA SURGERY 7 | Facility: SURGERY | Age: 79
Setting detail: OPHTHALMOLOGY
End: 2024-03-14
Payer: MEDICARE

## 2024-03-14 DIAGNOSIS — H57.03: ICD-10-CM

## 2024-03-14 DIAGNOSIS — H25.89: ICD-10-CM

## 2024-03-14 DIAGNOSIS — H25.12: ICD-10-CM

## 2024-03-14 PROCEDURE — 66982 XCAPSL CTRC RMVL CPLX WO ECP: CPT | Mod: LT | Performed by: OPHTHALMOLOGY

## 2024-03-15 ENCOUNTER — OFFICE (OUTPATIENT)
Dept: URBAN - METROPOLITAN AREA CLINIC 35 | Facility: CLINIC | Age: 79
Setting detail: OPHTHALMOLOGY
End: 2024-03-15
Payer: MEDICARE

## 2024-03-15 ENCOUNTER — RX ONLY (RX ONLY)
Age: 79
End: 2024-03-15

## 2024-03-15 DIAGNOSIS — H40.013: ICD-10-CM

## 2024-03-15 DIAGNOSIS — Z96.1: ICD-10-CM

## 2024-03-15 PROCEDURE — 99024 POSTOP FOLLOW-UP VISIT: CPT | Performed by: OPHTHALMOLOGY

## 2024-03-15 ASSESSMENT — LID EXAM ASSESSMENTS
OD_BLEPHARITIS: RUL 1+ 2+
OS_MEIBOMITIS: LLL 1+
OS_BLEPHARITIS: LUL 1+ 2+

## 2024-03-15 ASSESSMENT — REFRACTION_CURRENTRX
OS_SPHERE: -2.00
OS_AXIS: 101
OS_VPRISM_DIRECTION: PROGS
OS_OVR_VA: 20/
OD_AXIS: 006
OS_ADD: +2.50
OD_OVR_VA: 20/
OD_CYLINDER: +0.50
OD_ADD: +2.00
OD_SPHERE: -1.00
OS_CYLINDER: +1.25
OD_VPRISM_DIRECTION: PROGS

## 2024-04-12 RX ORDER — BUDESONIDE AND FORMOTEROL FUMARATE DIHYDRATE 160; 4.5 UG/1; UG/1
160-4.5 AEROSOL RESPIRATORY (INHALATION) TWICE DAILY
Qty: 1 | Refills: 4 | Status: ACTIVE | COMMUNITY
Start: 2024-04-12 | End: 1900-01-01

## 2024-04-15 ENCOUNTER — OFFICE (OUTPATIENT)
Dept: URBAN - METROPOLITAN AREA CLINIC 34 | Facility: CLINIC | Age: 79
Setting detail: OPHTHALMOLOGY
End: 2024-04-15
Payer: MEDICARE

## 2024-04-15 DIAGNOSIS — Z96.1: ICD-10-CM

## 2024-04-15 PROCEDURE — 99024 POSTOP FOLLOW-UP VISIT: CPT | Performed by: OPHTHALMOLOGY

## 2024-07-22 ENCOUNTER — DOCTOR'S OFFICE (OUTPATIENT)
Age: 79
Setting detail: OPHTHALMOLOGY
End: 2024-07-22
Payer: MEDICARE

## 2024-07-22 DIAGNOSIS — Z96.1: ICD-10-CM

## 2024-07-22 DIAGNOSIS — H40.013: ICD-10-CM

## 2024-07-22 PROCEDURE — 92083 EXTENDED VISUAL FIELD XM: CPT | Performed by: OPHTHALMOLOGY

## 2024-07-22 PROCEDURE — 99213 OFFICE O/P EST LOW 20 MIN: CPT | Performed by: OPHTHALMOLOGY

## 2024-07-22 PROCEDURE — 92133 CPTRZD OPH DX IMG PST SGM ON: CPT | Performed by: OPHTHALMOLOGY

## 2024-07-22 ASSESSMENT — CONFRONTATIONAL VISUAL FIELD TEST (CVF)
OD_FINDINGS: FULL
OS_FINDINGS: FULL

## 2025-06-16 ENCOUNTER — APPOINTMENT (OUTPATIENT)
Dept: ORTHOPEDIC SURGERY | Facility: CLINIC | Age: 80
End: 2025-06-16

## 2025-06-16 PROBLEM — S96.911A RIGHT ANKLE STRAIN: Status: ACTIVE | Noted: 2025-06-16

## 2025-06-16 PROBLEM — M20.21 HALLUX RIGIDUS OF RIGHT FOOT: Status: ACTIVE | Noted: 2025-06-16

## 2025-06-16 PROBLEM — M25.579 ANKLE PAIN: Status: ACTIVE | Noted: 2025-06-16

## 2025-06-16 PROBLEM — M25.571 ACUTE RIGHT ANKLE PAIN: Status: ACTIVE | Noted: 2025-06-16

## 2025-06-16 PROCEDURE — 99203 OFFICE O/P NEW LOW 30 MIN: CPT

## 2025-06-16 PROCEDURE — 73610 X-RAY EXAM OF ANKLE: CPT | Mod: RT

## 2025-06-16 PROCEDURE — 73590 X-RAY EXAM OF LOWER LEG: CPT | Mod: RT

## 2025-06-16 PROCEDURE — 73630 X-RAY EXAM OF FOOT: CPT | Mod: RT

## 2025-06-27 ENCOUNTER — OUTPATIENT (OUTPATIENT)
Dept: OUTPATIENT SERVICES | Facility: HOSPITAL | Age: 80
LOS: 1 days | End: 2025-06-27
Payer: COMMERCIAL

## 2025-06-27 ENCOUNTER — APPOINTMENT (OUTPATIENT)
Dept: ULTRASOUND IMAGING | Facility: CLINIC | Age: 80
End: 2025-06-27

## 2025-06-27 DIAGNOSIS — Z90.79 ACQUIRED ABSENCE OF OTHER GENITAL ORGAN(S): Chronic | ICD-10-CM

## 2025-06-27 DIAGNOSIS — Z98.89 OTHER SPECIFIED POSTPROCEDURAL STATES: Chronic | ICD-10-CM

## 2025-06-27 DIAGNOSIS — K40.90 UNILATERAL INGUINAL HERNIA, WITHOUT OBSTRUCTION OR GANGRENE, NOT SPECIFIED AS RECURRENT: Chronic | ICD-10-CM

## 2025-06-27 DIAGNOSIS — Z98.41 CATARACT EXTRACTION STATUS, RIGHT EYE: Chronic | ICD-10-CM

## 2025-06-27 DIAGNOSIS — Z00.00 ENCOUNTER FOR GENERAL ADULT MEDICAL EXAMINATION WITHOUT ABNORMAL FINDINGS: ICD-10-CM

## 2025-06-27 PROCEDURE — 93971 EXTREMITY STUDY: CPT

## 2025-06-27 PROCEDURE — 93971 EXTREMITY STUDY: CPT | Mod: 26,RT

## 2025-07-11 PROBLEM — M25.471 RIGHT ANKLE SWELLING: Status: ACTIVE | Noted: 2025-06-16

## 2025-07-11 PROBLEM — Z86.39 HISTORY OF DIABETES MELLITUS: Status: ACTIVE | Noted: 2025-07-11

## 2025-07-11 PROBLEM — M21.41 ACQUIRED PES PLANUS OF RIGHT FOOT: Status: ACTIVE | Noted: 2025-07-11

## 2025-07-11 PROBLEM — Z92.29 HISTORY OF ANTICOAGULANT THERAPY: Status: ACTIVE | Noted: 2025-07-11

## 2025-07-11 PROBLEM — I89.0 LYMPHEDEMA: Status: ACTIVE | Noted: 2025-07-11

## 2025-07-11 PROBLEM — I99.8 VASCULAR CALCIFICATION: Status: ACTIVE | Noted: 2025-07-11

## 2025-07-11 PROBLEM — M62.461 GASTROCNEMIUS EQUINUS OF RIGHT LOWER EXTREMITY: Status: ACTIVE | Noted: 2025-07-11
